# Patient Record
Sex: FEMALE | Race: WHITE | Employment: FULL TIME | URBAN - METROPOLITAN AREA
[De-identification: names, ages, dates, MRNs, and addresses within clinical notes are randomized per-mention and may not be internally consistent; named-entity substitution may affect disease eponyms.]

---

## 2016-04-18 LAB — MAMMOGRAPHY, EXTERNAL: NORMAL

## 2019-10-30 LAB — PAP SMEAR, EXTERNAL: NORMAL

## 2019-12-09 NOTE — PROGRESS NOTES
Name_______________________________________Printed:____________________  Date and time of surgery____12/16/19 @ 0800____________________Arrival Time:___0630 main hosp_____________   1. Do not eat or drink anything after 12 midnight (or____hours) prior to surgery. This includes no water, chewing gum or mints. Endoscopy patients follow your doctors bowel prep instructions,which may include taking part of prep after midnight If you have been instructed on ERAS or carb loading -please follow those instructions. 2. Take the following pills with a small sip of water on the morning of surgery_______none____________________________________________                  Do not take blood pressure medications ending in pril or sartan the navneet prior to surgery or the morning of surgery_   3. Aspirin, Ibuprofen, Advil, Naproxen, Vitamin E and other Anti-inflammatory products and supplements should be stopped for 5 -7days before surgery or as directed by your physician. 4. Check with your Doctor regarding stopping Plavix, Coumadin,Eliquis, Lovenox,Effient,Pradaxa,Xarelto, Fragmin or other blood thinners and follow their instructions. 5. Do not smoke, and do not drink any alcoholic beverages 24 hours prior to surgery. This includes NA Beer. Refrain from the usage of any recreational drugs. 6. You may brush your teeth and gargle the morning of surgery. DO NOT SWALLOW WATER   7. You MUST make arrangements for a responsible adult to stay on site while you are here and take you home after your surgery. You will not be allowed to leave alone or drive yourself home. It is strongly suggested someone stay with you the first 24 hrs. Your surgery will be cancelled if you do not have a ride home. 8. A parent/legal guardian must accompany a child scheduled for surgery and plan to stay at the hospital until the child is discharged. Please do not bring other children with you.    9. Please wear simple, loose fitting clothing to the \[facility]\"".  Antonio Dessert not bring valuables (money, credit cards, checkbooks, etc.) Do not wear any makeup (including no eye makeup) or nail polish on your fingers or toes. 10. DO NOT wear any jewelry or piercings on day of surgery. All body piercing jewelry must be removed. 11. If you have ___dentures, they will be removed before going to the OR; we will provide you a container. If you wear ___contact lenses or _x__glasses, they will be removed; please bring a case for them. 12. Please see your family doctor/pediatrician for a history & physical and/or concerning medications. Bring any test results/reports from your physician's office. PCP___wrenn_______________Phone___________H&P Appt. Date________             13 If you  have a Living Will and Durable Power of  for Healthcare, please bring in a copy. 15. Notify your Surgeon if you develop any illness between now and surgery  time, cough, cold, fever, sore throat, nausea, vomiting, etc.  Please notify your surgeon if you experience dizziness, shortness of breath or blurred vision between now & the time of your surgery             15. DO NOT shave your operative site 96 hours prior to surgery. For face & neck surgery, men may use an electric razor 48 hours prior to surgery. 16. Shower the night before or morning of surgery as directed. 17. To provide excellent care visitors will be limited to one in the room at any given time. 18.  Please bring picture ID and insurance card. 19.  Visit our web site for additional information:  [email or web address]              20.During flu season no children under the age of 15 are permitted in the hospital for the safety of all patients.                               21. If you take a long acting insulin in the evening only  take half of your usual  dose the night  before your procedure              22. If you use a c-pap please

## 2019-12-11 ENCOUNTER — HOSPITAL ENCOUNTER (OUTPATIENT)
Age: 54
Discharge: HOME | End: 2019-12-11
Payer: COMMERCIAL

## 2019-12-11 ENCOUNTER — HOSPITAL ENCOUNTER (OUTPATIENT)
Dept: GENERAL RADIOLOGY | Age: 54
Discharge: HOME | End: 2019-12-11
Payer: COMMERCIAL

## 2019-12-11 DIAGNOSIS — Z01.818 PREOP TESTING: ICD-10-CM

## 2019-12-11 LAB
A/G RATIO: 1.5 (ref 1.1–2.2)
ABO/RH: NORMAL
ALBUMIN SERPL-MCNC: 4.1 G/DL (ref 3.4–5)
ALP BLD-CCNC: 61 U/L (ref 40–129)
ALT SERPL-CCNC: 33 U/L (ref 10–40)
ANION GAP SERPL CALCULATED.3IONS-SCNC: 13 MMOL/L (ref 3–16)
ANTIBODY SCREEN: NORMAL
AST SERPL-CCNC: 18 U/L (ref 15–37)
BASOPHILS ABSOLUTE: 0 K/UL (ref 0–0.2)
BASOPHILS RELATIVE PERCENT: 0.7 %
BILIRUB SERPL-MCNC: 0.3 MG/DL (ref 0–1)
BILIRUBIN URINE: NEGATIVE
BLOOD, URINE: NEGATIVE
BUN BLDV-MCNC: 13 MG/DL (ref 7–20)
CALCIUM SERPL-MCNC: 9.4 MG/DL (ref 8.3–10.6)
CHLORIDE BLD-SCNC: 102 MMOL/L (ref 99–110)
CLARITY: CLEAR
CO2: 26 MMOL/L (ref 21–32)
COLOR: YELLOW
CREAT SERPL-MCNC: 0.7 MG/DL (ref 0.6–1.1)
EKG ATRIAL RATE: 71 BPM
EKG DIAGNOSIS: NORMAL
EKG P AXIS: 64 DEGREES
EKG P-R INTERVAL: 120 MS
EKG Q-T INTERVAL: 376 MS
EKG QRS DURATION: 76 MS
EKG QTC CALCULATION (BAZETT): 408 MS
EKG R AXIS: 52 DEGREES
EKG T AXIS: 42 DEGREES
EKG VENTRICULAR RATE: 71 BPM
EOSINOPHILS ABSOLUTE: 0.1 K/UL (ref 0–0.6)
EOSINOPHILS RELATIVE PERCENT: 1.7 %
GFR AFRICAN AMERICAN: >60
GFR NON-AFRICAN AMERICAN: >60
GLOBULIN: 2.8 G/DL
GLUCOSE BLD-MCNC: 82 MG/DL (ref 70–99)
GLUCOSE URINE: NEGATIVE MG/DL
HCT VFR BLD CALC: 42.1 % (ref 36–48)
HEMOGLOBIN: 14 G/DL (ref 12–16)
KETONES, URINE: NEGATIVE MG/DL
LEUKOCYTE ESTERASE, URINE: NEGATIVE
LYMPHOCYTES ABSOLUTE: 1.7 K/UL (ref 1–5.1)
LYMPHOCYTES RELATIVE PERCENT: 30.6 %
MCH RBC QN AUTO: 31.8 PG (ref 26–34)
MCHC RBC AUTO-ENTMCNC: 33.4 G/DL (ref 31–36)
MCV RBC AUTO: 95.2 FL (ref 80–100)
MICROSCOPIC EXAMINATION: NORMAL
MONOCYTES ABSOLUTE: 0.5 K/UL (ref 0–1.3)
MONOCYTES RELATIVE PERCENT: 9.2 %
NEUTROPHILS ABSOLUTE: 3.2 K/UL (ref 1.7–7.7)
NEUTROPHILS RELATIVE PERCENT: 57.8 %
NITRITE, URINE: NEGATIVE
PDW BLD-RTO: 13.2 % (ref 12.4–15.4)
PH UA: 7 (ref 5–8)
PLATELET # BLD: 258 K/UL (ref 135–450)
PMV BLD AUTO: 10.4 FL (ref 5–10.5)
POTASSIUM SERPL-SCNC: 4.3 MMOL/L (ref 3.5–5.1)
PROTEIN UA: NEGATIVE MG/DL
RBC # BLD: 4.42 M/UL (ref 4–5.2)
SODIUM BLD-SCNC: 141 MMOL/L (ref 136–145)
SPECIFIC GRAVITY UA: 1.01 (ref 1–1.03)
TOTAL PROTEIN: 6.9 G/DL (ref 6.4–8.2)
URINE TYPE: NORMAL
UROBILINOGEN, URINE: 0.2 E.U./DL
WBC # BLD: 5.5 K/UL (ref 4–11)

## 2019-12-11 PROCEDURE — 36415 COLL VENOUS BLD VENIPUNCTURE: CPT

## 2019-12-11 PROCEDURE — 93010 ELECTROCARDIOGRAM REPORT: CPT | Performed by: INTERNAL MEDICINE

## 2019-12-11 PROCEDURE — 86900 BLOOD TYPING SEROLOGIC ABO: CPT

## 2019-12-11 PROCEDURE — 85025 COMPLETE CBC W/AUTO DIFF WBC: CPT

## 2019-12-11 PROCEDURE — 80053 COMPREHEN METABOLIC PANEL: CPT

## 2019-12-11 PROCEDURE — 81003 URINALYSIS AUTO W/O SCOPE: CPT

## 2019-12-11 PROCEDURE — 93005 ELECTROCARDIOGRAM TRACING: CPT | Performed by: OBSTETRICS & GYNECOLOGY

## 2019-12-11 PROCEDURE — 86901 BLOOD TYPING SEROLOGIC RH(D): CPT

## 2019-12-11 PROCEDURE — 86850 RBC ANTIBODY SCREEN: CPT

## 2019-12-11 PROCEDURE — 87086 URINE CULTURE/COLONY COUNT: CPT

## 2019-12-11 PROCEDURE — 71046 X-RAY EXAM CHEST 2 VIEWS: CPT

## 2019-12-12 LAB — URINE CULTURE, ROUTINE: NORMAL

## 2019-12-16 ENCOUNTER — ANESTHESIA (OUTPATIENT)
Dept: OPERATING ROOM | Age: 54
DRG: 742 | End: 2019-12-16
Payer: COMMERCIAL

## 2019-12-16 ENCOUNTER — ANESTHESIA EVENT (OUTPATIENT)
Dept: OPERATING ROOM | Age: 54
DRG: 742 | End: 2019-12-16
Payer: COMMERCIAL

## 2019-12-16 ENCOUNTER — HOSPITAL ENCOUNTER (INPATIENT)
Age: 54
LOS: 3 days | Discharge: HOME | DRG: 742 | End: 2019-12-19
Attending: OBSTETRICS & GYNECOLOGY | Admitting: OBSTETRICS & GYNECOLOGY
Payer: COMMERCIAL

## 2019-12-16 VITALS
OXYGEN SATURATION: 100 % | TEMPERATURE: 97.5 F | RESPIRATION RATE: 52 BRPM | SYSTOLIC BLOOD PRESSURE: 157 MMHG | DIASTOLIC BLOOD PRESSURE: 81 MMHG

## 2019-12-16 PROBLEM — D27.0 MUCINOUS CYSTADENOMA OF OVARY, RIGHT: Status: ACTIVE | Noted: 2019-12-16

## 2019-12-16 LAB
ABO/RH: NORMAL
ANTIBODY SCREEN: NORMAL
APTT: 37.8 SEC (ref 24.2–36.2)
HCG(URINE) PREGNANCY TEST: NEGATIVE
INR BLD: 0.99 (ref 0.86–1.14)
PROTHROMBIN TIME: 11.5 SEC (ref 10–13.2)

## 2019-12-16 PROCEDURE — 88112 CYTOPATH CELL ENHANCE TECH: CPT

## 2019-12-16 PROCEDURE — 3600000004 HC SURGERY LEVEL 4 BASE: Performed by: OBSTETRICS & GYNECOLOGY

## 2019-12-16 PROCEDURE — 6360000002 HC RX W HCPCS: Performed by: OBSTETRICS & GYNECOLOGY

## 2019-12-16 PROCEDURE — 88305 TISSUE EXAM BY PATHOLOGIST: CPT

## 2019-12-16 PROCEDURE — 88332 PATH CONSLTJ SURG EA ADD BLK: CPT

## 2019-12-16 PROCEDURE — 7100000001 HC PACU RECOVERY - ADDTL 15 MIN: Performed by: OBSTETRICS & GYNECOLOGY

## 2019-12-16 PROCEDURE — 2500000003 HC RX 250 WO HCPCS: Performed by: FAMILY MEDICINE

## 2019-12-16 PROCEDURE — 6370000000 HC RX 637 (ALT 250 FOR IP): Performed by: OBSTETRICS & GYNECOLOGY

## 2019-12-16 PROCEDURE — 88331 PATH CONSLTJ SURG 1 BLK 1SPC: CPT

## 2019-12-16 PROCEDURE — 1200000000 HC SEMI PRIVATE

## 2019-12-16 PROCEDURE — 2580000003 HC RX 258

## 2019-12-16 PROCEDURE — 88307 TISSUE EXAM BY PATHOLOGIST: CPT

## 2019-12-16 PROCEDURE — 86901 BLOOD TYPING SEROLOGIC RH(D): CPT

## 2019-12-16 PROCEDURE — 2580000003 HC RX 258: Performed by: OBSTETRICS & GYNECOLOGY

## 2019-12-16 PROCEDURE — 36415 COLL VENOUS BLD VENIPUNCTURE: CPT

## 2019-12-16 PROCEDURE — 6360000002 HC RX W HCPCS: Performed by: FAMILY MEDICINE

## 2019-12-16 PROCEDURE — 2500000003 HC RX 250 WO HCPCS: Performed by: OBSTETRICS & GYNECOLOGY

## 2019-12-16 PROCEDURE — 84703 CHORIONIC GONADOTROPIN ASSAY: CPT

## 2019-12-16 PROCEDURE — 3700000000 HC ANESTHESIA ATTENDED CARE: Performed by: OBSTETRICS & GYNECOLOGY

## 2019-12-16 PROCEDURE — 88302 TISSUE EXAM BY PATHOLOGIST: CPT

## 2019-12-16 PROCEDURE — 86850 RBC ANTIBODY SCREEN: CPT

## 2019-12-16 PROCEDURE — 86900 BLOOD TYPING SEROLOGIC ABO: CPT

## 2019-12-16 PROCEDURE — 0UT90ZZ RESECTION OF UTERUS, OPEN APPROACH: ICD-10-PCS | Performed by: OBSTETRICS & GYNECOLOGY

## 2019-12-16 PROCEDURE — 3600000014 HC SURGERY LEVEL 4 ADDTL 15MIN: Performed by: OBSTETRICS & GYNECOLOGY

## 2019-12-16 PROCEDURE — 3E1M38X IRRIGATION OF PERITONEAL CAVITY USING IRRIGATING SUBSTANCE, PERCUTANEOUS APPROACH, DIAGNOSTIC: ICD-10-PCS | Performed by: OBSTETRICS & GYNECOLOGY

## 2019-12-16 PROCEDURE — 85730 THROMBOPLASTIN TIME PARTIAL: CPT

## 2019-12-16 PROCEDURE — 2709999900 HC NON-CHARGEABLE SUPPLY: Performed by: OBSTETRICS & GYNECOLOGY

## 2019-12-16 PROCEDURE — 85610 PROTHROMBIN TIME: CPT

## 2019-12-16 PROCEDURE — 7100000000 HC PACU RECOVERY - FIRST 15 MIN: Performed by: OBSTETRICS & GYNECOLOGY

## 2019-12-16 PROCEDURE — 94770 HC ETCO2 MONITOR DAILY: CPT

## 2019-12-16 PROCEDURE — 0UT10ZZ RESECTION OF LEFT OVARY, OPEN APPROACH: ICD-10-PCS | Performed by: OBSTETRICS & GYNECOLOGY

## 2019-12-16 PROCEDURE — 3700000001 HC ADD 15 MINUTES (ANESTHESIA): Performed by: OBSTETRICS & GYNECOLOGY

## 2019-12-16 PROCEDURE — 0DTJ0ZZ RESECTION OF APPENDIX, OPEN APPROACH: ICD-10-PCS | Performed by: OBSTETRICS & GYNECOLOGY

## 2019-12-16 PROCEDURE — 0UT70ZZ RESECTION OF BILATERAL FALLOPIAN TUBES, OPEN APPROACH: ICD-10-PCS | Performed by: OBSTETRICS & GYNECOLOGY

## 2019-12-16 RX ORDER — MAGNESIUM HYDROXIDE 1200 MG/15ML
LIQUID ORAL CONTINUOUS PRN
Status: COMPLETED | OUTPATIENT
Start: 2019-12-16 | End: 2019-12-16

## 2019-12-16 RX ORDER — PROPOFOL 10 MG/ML
INJECTION, EMULSION INTRAVENOUS PRN
Status: DISCONTINUED | OUTPATIENT
Start: 2019-12-16 | End: 2019-12-16 | Stop reason: SDUPTHER

## 2019-12-16 RX ORDER — SODIUM CHLORIDE, SODIUM LACTATE, POTASSIUM CHLORIDE, CALCIUM CHLORIDE 600; 310; 30; 20 MG/100ML; MG/100ML; MG/100ML; MG/100ML
INJECTION, SOLUTION INTRAVENOUS CONTINUOUS
Status: DISCONTINUED | OUTPATIENT
Start: 2019-12-16 | End: 2019-12-16

## 2019-12-16 RX ORDER — FENTANYL CITRATE 50 UG/ML
50 INJECTION, SOLUTION INTRAMUSCULAR; INTRAVENOUS EVERY 5 MIN PRN
Status: DISCONTINUED | OUTPATIENT
Start: 2019-12-16 | End: 2019-12-16 | Stop reason: HOSPADM

## 2019-12-16 RX ORDER — SODIUM CHLORIDE 9 MG/ML
INJECTION, SOLUTION INTRAVENOUS CONTINUOUS
Status: DISCONTINUED | OUTPATIENT
Start: 2019-12-16 | End: 2019-12-16

## 2019-12-16 RX ORDER — FENTANYL CITRATE 50 UG/ML
INJECTION, SOLUTION INTRAMUSCULAR; INTRAVENOUS PRN
Status: DISCONTINUED | OUTPATIENT
Start: 2019-12-16 | End: 2019-12-16 | Stop reason: SDUPTHER

## 2019-12-16 RX ORDER — LIDOCAINE HYDROCHLORIDE 10 MG/ML
0.5 INJECTION, SOLUTION EPIDURAL; INFILTRATION; INTRACAUDAL; PERINEURAL ONCE
Status: DISCONTINUED | OUTPATIENT
Start: 2019-12-16 | End: 2019-12-16 | Stop reason: HOSPADM

## 2019-12-16 RX ORDER — MAGNESIUM SULFATE HEPTAHYDRATE 500 MG/ML
INJECTION, SOLUTION INTRAMUSCULAR; INTRAVENOUS PRN
Status: DISCONTINUED | OUTPATIENT
Start: 2019-12-16 | End: 2019-12-16

## 2019-12-16 RX ORDER — SODIUM CHLORIDE 0.9 % (FLUSH) 0.9 %
10 SYRINGE (ML) INJECTION EVERY 12 HOURS SCHEDULED
Status: DISCONTINUED | OUTPATIENT
Start: 2019-12-16 | End: 2019-12-16 | Stop reason: HOSPADM

## 2019-12-16 RX ORDER — OXYCODONE HYDROCHLORIDE 5 MG/1
10 TABLET ORAL PRN
Status: DISCONTINUED | OUTPATIENT
Start: 2019-12-16 | End: 2019-12-16 | Stop reason: HOSPADM

## 2019-12-16 RX ORDER — DEXTROSE AND SODIUM CHLORIDE 5; .45 G/100ML; G/100ML
INJECTION, SOLUTION INTRAVENOUS CONTINUOUS
Status: DISCONTINUED | OUTPATIENT
Start: 2019-12-16 | End: 2019-12-17

## 2019-12-16 RX ORDER — HYDROMORPHONE HCL 110MG/55ML
0.5 PATIENT CONTROLLED ANALGESIA SYRINGE INTRAVENOUS EVERY 5 MIN PRN
Status: DISCONTINUED | OUTPATIENT
Start: 2019-12-16 | End: 2019-12-16 | Stop reason: HOSPADM

## 2019-12-16 RX ORDER — SODIUM CHLORIDE 0.9 % (FLUSH) 0.9 %
10 SYRINGE (ML) INJECTION EVERY 12 HOURS SCHEDULED
Status: DISCONTINUED | OUTPATIENT
Start: 2019-12-16 | End: 2019-12-19 | Stop reason: HOSPADM

## 2019-12-16 RX ORDER — MEPERIDINE HYDROCHLORIDE 25 MG/ML
12.5 INJECTION INTRAMUSCULAR; INTRAVENOUS; SUBCUTANEOUS EVERY 5 MIN PRN
Status: DISCONTINUED | OUTPATIENT
Start: 2019-12-16 | End: 2019-12-16 | Stop reason: HOSPADM

## 2019-12-16 RX ORDER — HYDROMORPHONE HCL 110MG/55ML
0.25 PATIENT CONTROLLED ANALGESIA SYRINGE INTRAVENOUS EVERY 5 MIN PRN
Status: DISCONTINUED | OUTPATIENT
Start: 2019-12-16 | End: 2019-12-16 | Stop reason: HOSPADM

## 2019-12-16 RX ORDER — VECURONIUM BROMIDE 1 MG/ML
INJECTION, POWDER, LYOPHILIZED, FOR SOLUTION INTRAVENOUS PRN
Status: DISCONTINUED | OUTPATIENT
Start: 2019-12-16 | End: 2019-12-16 | Stop reason: SDUPTHER

## 2019-12-16 RX ORDER — BUPIVACAINE HYDROCHLORIDE AND EPINEPHRINE 5; 5 MG/ML; UG/ML
INJECTION, SOLUTION PERINEURAL
Status: COMPLETED | OUTPATIENT
Start: 2019-12-16 | End: 2019-12-16

## 2019-12-16 RX ORDER — DEXAMETHASONE SODIUM PHOSPHATE 4 MG/ML
INJECTION, SOLUTION INTRA-ARTICULAR; INTRALESIONAL; INTRAMUSCULAR; INTRAVENOUS; SOFT TISSUE PRN
Status: DISCONTINUED | OUTPATIENT
Start: 2019-12-16 | End: 2019-12-16 | Stop reason: SDUPTHER

## 2019-12-16 RX ORDER — LIDOCAINE HYDROCHLORIDE 20 MG/ML
INJECTION, SOLUTION EPIDURAL; INFILTRATION; INTRACAUDAL; PERINEURAL PRN
Status: DISCONTINUED | OUTPATIENT
Start: 2019-12-16 | End: 2019-12-16 | Stop reason: SDUPTHER

## 2019-12-16 RX ORDER — MIDAZOLAM HYDROCHLORIDE 1 MG/ML
INJECTION INTRAMUSCULAR; INTRAVENOUS PRN
Status: DISCONTINUED | OUTPATIENT
Start: 2019-12-16 | End: 2019-12-16 | Stop reason: SDUPTHER

## 2019-12-16 RX ORDER — LABETALOL HYDROCHLORIDE 5 MG/ML
5 INJECTION, SOLUTION INTRAVENOUS EVERY 10 MIN PRN
Status: DISCONTINUED | OUTPATIENT
Start: 2019-12-16 | End: 2019-12-16 | Stop reason: HOSPADM

## 2019-12-16 RX ORDER — NALOXONE HYDROCHLORIDE 0.4 MG/ML
0.4 INJECTION, SOLUTION INTRAMUSCULAR; INTRAVENOUS; SUBCUTANEOUS PRN
Status: DISCONTINUED | OUTPATIENT
Start: 2019-12-16 | End: 2019-12-19 | Stop reason: HOSPADM

## 2019-12-16 RX ORDER — PROMETHAZINE HYDROCHLORIDE 25 MG/ML
6.25 INJECTION, SOLUTION INTRAMUSCULAR; INTRAVENOUS PRN
Status: DISCONTINUED | OUTPATIENT
Start: 2019-12-16 | End: 2019-12-16 | Stop reason: HOSPADM

## 2019-12-16 RX ORDER — ONDANSETRON 2 MG/ML
4 INJECTION INTRAMUSCULAR; INTRAVENOUS EVERY 8 HOURS PRN
Status: DISCONTINUED | OUTPATIENT
Start: 2019-12-16 | End: 2019-12-19 | Stop reason: HOSPADM

## 2019-12-16 RX ORDER — DEXTROSE AND SODIUM CHLORIDE 5; .45 G/100ML; G/100ML
INJECTION, SOLUTION INTRAVENOUS
Status: COMPLETED
Start: 2019-12-16 | End: 2019-12-16

## 2019-12-16 RX ORDER — ACETAMINOPHEN 500 MG
500 TABLET ORAL EVERY 6 HOURS
Status: DISCONTINUED | OUTPATIENT
Start: 2019-12-16 | End: 2019-12-19 | Stop reason: HOSPADM

## 2019-12-16 RX ORDER — SODIUM CHLORIDE 0.9 % (FLUSH) 0.9 %
10 SYRINGE (ML) INJECTION PRN
Status: DISCONTINUED | OUTPATIENT
Start: 2019-12-16 | End: 2019-12-16 | Stop reason: HOSPADM

## 2019-12-16 RX ORDER — MAGNESIUM HYDROXIDE 1200 MG/15ML
LIQUID ORAL
Status: COMPLETED | OUTPATIENT
Start: 2019-12-16 | End: 2019-12-16

## 2019-12-16 RX ORDER — ONDANSETRON 2 MG/ML
INJECTION INTRAMUSCULAR; INTRAVENOUS PRN
Status: DISCONTINUED | OUTPATIENT
Start: 2019-12-16 | End: 2019-12-16 | Stop reason: SDUPTHER

## 2019-12-16 RX ORDER — KETOROLAC TROMETHAMINE 30 MG/ML
INJECTION, SOLUTION INTRAMUSCULAR; INTRAVENOUS PRN
Status: DISCONTINUED | OUTPATIENT
Start: 2019-12-16 | End: 2019-12-16 | Stop reason: SDUPTHER

## 2019-12-16 RX ORDER — APREPITANT 40 MG/1
40 CAPSULE ORAL ONCE
Status: COMPLETED | OUTPATIENT
Start: 2019-12-16 | End: 2019-12-16

## 2019-12-16 RX ORDER — OXYCODONE HYDROCHLORIDE 5 MG/1
5 TABLET ORAL PRN
Status: DISCONTINUED | OUTPATIENT
Start: 2019-12-16 | End: 2019-12-16 | Stop reason: HOSPADM

## 2019-12-16 RX ORDER — DIPHENHYDRAMINE HYDROCHLORIDE 50 MG/ML
12.5 INJECTION INTRAMUSCULAR; INTRAVENOUS
Status: DISCONTINUED | OUTPATIENT
Start: 2019-12-16 | End: 2019-12-16 | Stop reason: HOSPADM

## 2019-12-16 RX ORDER — SODIUM CHLORIDE 0.9 % (FLUSH) 0.9 %
10 SYRINGE (ML) INJECTION PRN
Status: DISCONTINUED | OUTPATIENT
Start: 2019-12-16 | End: 2019-12-19 | Stop reason: HOSPADM

## 2019-12-16 RX ORDER — MAGNESIUM SULFATE HEPTAHYDRATE 500 MG/ML
INJECTION, SOLUTION INTRAMUSCULAR; INTRAVENOUS PRN
Status: DISCONTINUED | OUTPATIENT
Start: 2019-12-16 | End: 2019-12-16 | Stop reason: SDUPTHER

## 2019-12-16 RX ORDER — SUCCINYLCHOLINE/SOD CL,ISO/PF 200MG/10ML
SYRINGE (ML) INTRAVENOUS PRN
Status: DISCONTINUED | OUTPATIENT
Start: 2019-12-16 | End: 2019-12-16 | Stop reason: SDUPTHER

## 2019-12-16 RX ORDER — KETOROLAC TROMETHAMINE 15 MG/ML
15 INJECTION, SOLUTION INTRAMUSCULAR; INTRAVENOUS EVERY 6 HOURS
Status: COMPLETED | OUTPATIENT
Start: 2019-12-16 | End: 2019-12-18

## 2019-12-16 RX ORDER — CEFAZOLIN SODIUM 2 G/100ML
2 INJECTION, SOLUTION INTRAVENOUS
Status: COMPLETED | OUTPATIENT
Start: 2019-12-16 | End: 2019-12-16

## 2019-12-16 RX ADMIN — VECURONIUM BROMIDE 5 MG: 1 INJECTION, POWDER, LYOPHILIZED, FOR SOLUTION INTRAVENOUS at 08:30

## 2019-12-16 RX ADMIN — PROMETHAZINE HYDROCHLORIDE 6.25 MG: 25 INJECTION INTRAMUSCULAR; INTRAVENOUS at 10:34

## 2019-12-16 RX ADMIN — KETOROLAC TROMETHAMINE 30 MG: 30 INJECTION, SOLUTION INTRAMUSCULAR; INTRAVENOUS at 10:06

## 2019-12-16 RX ADMIN — FENTANYL CITRATE 50 MCG: 50 INJECTION, SOLUTION INTRAMUSCULAR; INTRAVENOUS at 08:20

## 2019-12-16 RX ADMIN — ACETAMINOPHEN 500 MG: 500 TABLET, FILM COATED ORAL at 13:18

## 2019-12-16 RX ADMIN — MAGNESIUM SULFATE HEPTAHYDRATE 1 G: 500 INJECTION, SOLUTION INTRAMUSCULAR; INTRAVENOUS at 08:29

## 2019-12-16 RX ADMIN — KETOROLAC TROMETHAMINE 15 MG: 15 INJECTION, SOLUTION INTRAMUSCULAR; INTRAVENOUS at 13:22

## 2019-12-16 RX ADMIN — LIDOCAINE HYDROCHLORIDE 100 MG: 20 INJECTION, SOLUTION EPIDURAL; INFILTRATION; INTRACAUDAL; PERINEURAL at 08:21

## 2019-12-16 RX ADMIN — DEXTROSE AND SODIUM CHLORIDE: 5; 450 INJECTION, SOLUTION INTRAVENOUS at 10:44

## 2019-12-16 RX ADMIN — PROPOFOL 140 MG: 10 INJECTION, EMULSION INTRAVENOUS at 08:22

## 2019-12-16 RX ADMIN — DEXAMETHASONE SODIUM PHOSPHATE 4 MG: 4 INJECTION, SOLUTION INTRAMUSCULAR; INTRAVENOUS at 08:29

## 2019-12-16 RX ADMIN — PHENYLEPHRINE HYDROCHLORIDE 50 MCG: 10 INJECTION INTRAVENOUS at 08:52

## 2019-12-16 RX ADMIN — ACETAMINOPHEN 500 MG: 500 TABLET, FILM COATED ORAL at 18:41

## 2019-12-16 RX ADMIN — HYDROMORPHONE HYDROCHLORIDE 0.5 MG: 2 INJECTION INTRAMUSCULAR; INTRAVENOUS; SUBCUTANEOUS at 10:35

## 2019-12-16 RX ADMIN — ONDANSETRON 4 MG: 2 INJECTION INTRAMUSCULAR; INTRAVENOUS at 08:29

## 2019-12-16 RX ADMIN — HYDROMORPHONE HYDROCHLORIDE 0.5 MG: 2 INJECTION INTRAMUSCULAR; INTRAVENOUS; SUBCUTANEOUS at 10:47

## 2019-12-16 RX ADMIN — KETOROLAC TROMETHAMINE 15 MG: 15 INJECTION, SOLUTION INTRAMUSCULAR; INTRAVENOUS at 18:44

## 2019-12-16 RX ADMIN — VECURONIUM BROMIDE 2 MG: 1 INJECTION, POWDER, LYOPHILIZED, FOR SOLUTION INTRAVENOUS at 09:06

## 2019-12-16 RX ADMIN — Medication 10 MG: at 11:02

## 2019-12-16 RX ADMIN — SUGAMMADEX 200 MG: 100 INJECTION, SOLUTION INTRAVENOUS at 09:52

## 2019-12-16 RX ADMIN — CEFAZOLIN SODIUM 2 G: 2 INJECTION, SOLUTION INTRAVENOUS at 08:07

## 2019-12-16 RX ADMIN — FENTANYL CITRATE 50 MCG: 50 INJECTION, SOLUTION INTRAMUSCULAR; INTRAVENOUS at 08:12

## 2019-12-16 RX ADMIN — MIDAZOLAM 2 MG: 1 INJECTION INTRAMUSCULAR; INTRAVENOUS at 08:16

## 2019-12-16 RX ADMIN — Medication 10 ML: at 21:24

## 2019-12-16 RX ADMIN — Medication 150 MG: at 08:22

## 2019-12-16 RX ADMIN — APREPITANT 40 MG: 40 CAPSULE ORAL at 08:07

## 2019-12-16 ASSESSMENT — PAIN SCALES - GENERAL
PAINLEVEL_OUTOF10: 3
PAINLEVEL_OUTOF10: 1
PAINLEVEL_OUTOF10: 0
PAINLEVEL_OUTOF10: 3
PAINLEVEL_OUTOF10: 4
PAINLEVEL_OUTOF10: 3
PAINLEVEL_OUTOF10: 7
PAINLEVEL_OUTOF10: 7

## 2019-12-16 ASSESSMENT — PULMONARY FUNCTION TESTS
PIF_VALUE: 13
PIF_VALUE: 14
PIF_VALUE: 14
PIF_VALUE: 13
PIF_VALUE: 14
PIF_VALUE: 14
PIF_VALUE: 33
PIF_VALUE: 14
PIF_VALUE: 19
PIF_VALUE: 14
PIF_VALUE: 19
PIF_VALUE: 24
PIF_VALUE: 14
PIF_VALUE: 14
PIF_VALUE: 13
PIF_VALUE: 14
PIF_VALUE: 14
PIF_VALUE: 15
PIF_VALUE: 0
PIF_VALUE: 14
PIF_VALUE: 19
PIF_VALUE: 13
PIF_VALUE: 13
PIF_VALUE: 0
PIF_VALUE: 13
PIF_VALUE: 14
PIF_VALUE: 14
PIF_VALUE: 19
PIF_VALUE: 14
PIF_VALUE: 14
PIF_VALUE: 19
PIF_VALUE: 14
PIF_VALUE: 19
PIF_VALUE: 0
PIF_VALUE: 13
PIF_VALUE: 1
PIF_VALUE: 13
PIF_VALUE: 14
PIF_VALUE: 19
PIF_VALUE: 13
PIF_VALUE: 14
PIF_VALUE: 13
PIF_VALUE: 14
PIF_VALUE: 1
PIF_VALUE: 14
PIF_VALUE: 14
PIF_VALUE: 13
PIF_VALUE: 14
PIF_VALUE: 14
PIF_VALUE: 19
PIF_VALUE: 27
PIF_VALUE: 13
PIF_VALUE: 13
PIF_VALUE: 14
PIF_VALUE: 13
PIF_VALUE: 14
PIF_VALUE: 19
PIF_VALUE: 4
PIF_VALUE: 14
PIF_VALUE: 19
PIF_VALUE: 0
PIF_VALUE: 14
PIF_VALUE: 14
PIF_VALUE: 19
PIF_VALUE: 0
PIF_VALUE: 14
PIF_VALUE: 14
PIF_VALUE: 13
PIF_VALUE: 19
PIF_VALUE: 12
PIF_VALUE: 13
PIF_VALUE: 13
PIF_VALUE: 14
PIF_VALUE: 14
PIF_VALUE: 19
PIF_VALUE: 14
PIF_VALUE: 13
PIF_VALUE: 21
PIF_VALUE: 0
PIF_VALUE: 0
PIF_VALUE: 13
PIF_VALUE: 13
PIF_VALUE: 14
PIF_VALUE: 19
PIF_VALUE: 7
PIF_VALUE: 14
PIF_VALUE: 15
PIF_VALUE: 14
PIF_VALUE: 14
PIF_VALUE: 15
PIF_VALUE: 0
PIF_VALUE: 14
PIF_VALUE: 13
PIF_VALUE: 14
PIF_VALUE: 4
PIF_VALUE: 14
PIF_VALUE: 14
PIF_VALUE: 13
PIF_VALUE: 1
PIF_VALUE: 14
PIF_VALUE: 14
PIF_VALUE: 0
PIF_VALUE: 13
PIF_VALUE: 19
PIF_VALUE: 19
PIF_VALUE: 13
PIF_VALUE: 0
PIF_VALUE: 14

## 2019-12-16 ASSESSMENT — PAIN DESCRIPTION - DESCRIPTORS: DESCRIPTORS: PRESSURE

## 2019-12-16 ASSESSMENT — PAIN DESCRIPTION - PAIN TYPE
TYPE: SURGICAL PAIN
TYPE: SURGICAL PAIN

## 2019-12-16 ASSESSMENT — PAIN - FUNCTIONAL ASSESSMENT: PAIN_FUNCTIONAL_ASSESSMENT: 0-10

## 2019-12-16 ASSESSMENT — PAIN DESCRIPTION - LOCATION: LOCATION: ABDOMEN;PELVIS

## 2019-12-16 NOTE — H&P
I have reviewed the history and physical and examined the patient and find no relevant changes. I have reviewed with the patient and/or family the risks, benefits, and alternatives to the procedure.     Chaitanya Valenzuela MD  12/16/2019

## 2019-12-16 NOTE — PROGRESS NOTES
Incentive Spirometry education and demonstration completed by Respiratory Therapy Yes         Response to education: Very Good     Teaching Time: 5 minutes    Minimum Predicted Vital Capacity - 639 mL. Patient's Actual Vital Capacity - 1200 mL. Turning over to Nursing for routine follow-up Yes.     Comments: continue spirometer q2h w/a    Electronically signed by Paramjit Galvan RCP on 12/16/2019 at 5:25 PM

## 2019-12-16 NOTE — BRIEF OP NOTE
Brief Postoperative Note  ______________________________________________________________    Patient: Oksana Bee  YOB: 1965  MRN: [id number]  Date of Procedure: 12/16/2019    Pre-Op Diagnosis: PELVIC MASS R19.00    Post-Op Diagnosis: Same       Procedure(s):  OPEN REMOVAL OF PELVIC MASS, HYSTERECTOMY, RIGHT SALPINGO- OOPHORECTOMY, LEFT SALPINGECTOMY, APPENDECTOMY    Anesthesia: General    Surgeon(s):  Javier Sena MD    Assistant:   RONALD Spencer    Estimated Blood Loss (mL): 297     Complications: None    Specimens:   ID Type Source Tests Collected by Time Destination   1 : 1) PELVIC WASHINGS FOR CYTOLOGY Body Fluid Fluid CYTOLOGY, NON-GYN Javier Sena MD 12/16/2019 6727    A : A) RIGHT FALLOPIAN TUBE AND OVARY FROZEN Tissue Tissue SURGICAL PATHOLOGY Javier Sena MD 12/16/2019 0854    B : B) UTERUS, CERVIX, LEFT FALLOPIAN TUBE Tissue Tissue SURGICAL PATHOLOGY Javier Sena MD 12/16/2019 7865    C : C) APPENDIX Tissue Tissue SURGICAL PATHOLOGY Javier Sena MD 12/16/2019 3843        Implants:  * No implants in log *      Drains:   Urethral Catheter Non-latex 16 fr (Active)       Findings: 24 cm right adnexal mass. Approximately 3 liters of fluid in mass. 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 Normal upper abdomen, left adnexa, uterus/cervix and appendix.      Javier Sena MD  Date: 12/16/2019  Time: 10:32 AM

## 2019-12-16 NOTE — PROGRESS NOTES
Patient resting in bed with eyes closed, tolerating water at this time. Offered more clear liquids but patient declines. Denies additional needs at this time. Bed alarm engaged, call light in reach. Will monitor.

## 2019-12-16 NOTE — PROGRESS NOTES
Incentive Spirometry education and demonstration completed by Respiratory Therapy yes     Response to education: Very Good     Teaching Time: 5 minutes    Minimum Predicted Vital Capacity - 639 mL. Patient's Actual Vital Capacity - 1200 mL. Turning over to Nursing for routine follow-up Yes.     Comments: continue spirometer q2h w/a    Electronically signed by Mario Vang RCP on 12/16/2019 at 5:31 PM

## 2019-12-17 LAB
ANION GAP SERPL CALCULATED.3IONS-SCNC: 9 MMOL/L (ref 3–16)
BASOPHILS ABSOLUTE: 0.1 K/UL (ref 0–0.2)
BASOPHILS RELATIVE PERCENT: 0.4 %
BUN BLDV-MCNC: 5 MG/DL (ref 7–20)
CALCIUM SERPL-MCNC: 8.2 MG/DL (ref 8.3–10.6)
CHLORIDE BLD-SCNC: 108 MMOL/L (ref 99–110)
CO2: 24 MMOL/L (ref 21–32)
CREAT SERPL-MCNC: 0.7 MG/DL (ref 0.6–1.1)
EOSINOPHILS ABSOLUTE: 0 K/UL (ref 0–0.6)
EOSINOPHILS RELATIVE PERCENT: 0 %
GFR AFRICAN AMERICAN: >60
GFR NON-AFRICAN AMERICAN: >60
GLUCOSE BLD-MCNC: 132 MG/DL (ref 70–99)
HCT VFR BLD CALC: 38.3 % (ref 36–48)
HEMOGLOBIN: 12.7 G/DL (ref 12–16)
LYMPHOCYTES ABSOLUTE: 1.1 K/UL (ref 1–5.1)
LYMPHOCYTES RELATIVE PERCENT: 8.9 %
MAGNESIUM: 2 MG/DL (ref 1.8–2.4)
MCH RBC QN AUTO: 31.7 PG (ref 26–34)
MCHC RBC AUTO-ENTMCNC: 33.3 G/DL (ref 31–36)
MCV RBC AUTO: 95.4 FL (ref 80–100)
MONOCYTES ABSOLUTE: 1 K/UL (ref 0–1.3)
MONOCYTES RELATIVE PERCENT: 8.3 %
NEUTROPHILS ABSOLUTE: 10.2 K/UL (ref 1.7–7.7)
NEUTROPHILS RELATIVE PERCENT: 82.4 %
PDW BLD-RTO: 13.3 % (ref 12.4–15.4)
PHOSPHORUS: 2.3 MG/DL (ref 2.5–4.9)
PLATELET # BLD: 218 K/UL (ref 135–450)
PMV BLD AUTO: 9.7 FL (ref 5–10.5)
POTASSIUM SERPL-SCNC: 4.1 MMOL/L (ref 3.5–5.1)
RBC # BLD: 4.01 M/UL (ref 4–5.2)
SODIUM BLD-SCNC: 141 MMOL/L (ref 136–145)
WBC # BLD: 12.4 K/UL (ref 4–11)

## 2019-12-17 PROCEDURE — 83735 ASSAY OF MAGNESIUM: CPT

## 2019-12-17 PROCEDURE — 36415 COLL VENOUS BLD VENIPUNCTURE: CPT

## 2019-12-17 PROCEDURE — 84100 ASSAY OF PHOSPHORUS: CPT

## 2019-12-17 PROCEDURE — 94770 HC ETCO2 MONITOR DAILY: CPT

## 2019-12-17 PROCEDURE — 1200000000 HC SEMI PRIVATE

## 2019-12-17 PROCEDURE — 6360000002 HC RX W HCPCS: Performed by: OBSTETRICS & GYNECOLOGY

## 2019-12-17 PROCEDURE — 80048 BASIC METABOLIC PNL TOTAL CA: CPT

## 2019-12-17 PROCEDURE — 85025 COMPLETE CBC W/AUTO DIFF WBC: CPT

## 2019-12-17 PROCEDURE — 6370000000 HC RX 637 (ALT 250 FOR IP): Performed by: OBSTETRICS & GYNECOLOGY

## 2019-12-17 PROCEDURE — 2580000003 HC RX 258: Performed by: OBSTETRICS & GYNECOLOGY

## 2019-12-17 PROCEDURE — 94761 N-INVAS EAR/PLS OXIMETRY MLT: CPT

## 2019-12-17 RX ORDER — BACITRACIN, NEOMYCIN, POLYMYXIN B 400; 3.5; 5 [USP'U]/G; MG/G; [USP'U]/G
OINTMENT TOPICAL 2 TIMES DAILY
Status: DISCONTINUED | OUTPATIENT
Start: 2019-12-17 | End: 2019-12-19 | Stop reason: HOSPADM

## 2019-12-17 RX ORDER — CALCIUM CARBONATE 200(500)MG
1000 TABLET,CHEWABLE ORAL 3 TIMES DAILY PRN
Status: DISCONTINUED | OUTPATIENT
Start: 2019-12-17 | End: 2019-12-19 | Stop reason: HOSPADM

## 2019-12-17 RX ORDER — DOCUSATE SODIUM 100 MG/1
100 CAPSULE, LIQUID FILLED ORAL 2 TIMES DAILY PRN
Status: DISCONTINUED | OUTPATIENT
Start: 2019-12-17 | End: 2019-12-18

## 2019-12-17 RX ORDER — HYDROMORPHONE HYDROCHLORIDE 1 MG/ML
0.5 INJECTION, SOLUTION INTRAMUSCULAR; INTRAVENOUS; SUBCUTANEOUS EVERY 4 HOURS PRN
Status: DISCONTINUED | OUTPATIENT
Start: 2019-12-17 | End: 2019-12-19 | Stop reason: HOSPADM

## 2019-12-17 RX ORDER — OXYCODONE HYDROCHLORIDE 5 MG/1
10 TABLET ORAL EVERY 4 HOURS PRN
Status: DISCONTINUED | OUTPATIENT
Start: 2019-12-17 | End: 2019-12-19 | Stop reason: HOSPADM

## 2019-12-17 RX ORDER — OXYCODONE HYDROCHLORIDE 5 MG/1
5 TABLET ORAL EVERY 4 HOURS PRN
Status: DISCONTINUED | OUTPATIENT
Start: 2019-12-17 | End: 2019-12-19 | Stop reason: HOSPADM

## 2019-12-17 RX ADMIN — Medication 10 ML: at 22:10

## 2019-12-17 RX ADMIN — KETOROLAC TROMETHAMINE 15 MG: 15 INJECTION, SOLUTION INTRAMUSCULAR; INTRAVENOUS at 06:45

## 2019-12-17 RX ADMIN — ENOXAPARIN SODIUM 40 MG: 40 INJECTION SUBCUTANEOUS at 10:17

## 2019-12-17 RX ADMIN — ACETAMINOPHEN 500 MG: 500 TABLET, FILM COATED ORAL at 00:46

## 2019-12-17 RX ADMIN — OXYCODONE HYDROCHLORIDE 5 MG: 5 TABLET ORAL at 17:53

## 2019-12-17 RX ADMIN — BACITRACIN ZINC NEOMYCIN SULFATE POLYMYXIN B SULFATE: 400; 3.5; 5 OINTMENT TOPICAL at 11:07

## 2019-12-17 RX ADMIN — KETOROLAC TROMETHAMINE 15 MG: 15 INJECTION, SOLUTION INTRAMUSCULAR; INTRAVENOUS at 12:10

## 2019-12-17 RX ADMIN — ACETAMINOPHEN 500 MG: 500 TABLET, FILM COATED ORAL at 06:45

## 2019-12-17 RX ADMIN — ACETAMINOPHEN 500 MG: 500 TABLET, FILM COATED ORAL at 17:53

## 2019-12-17 RX ADMIN — DEXTROSE AND SODIUM CHLORIDE: 5; 450 INJECTION, SOLUTION INTRAVENOUS at 02:49

## 2019-12-17 RX ADMIN — BACITRACIN ZINC NEOMYCIN SULFATE POLYMYXIN B SULFATE: 400; 3.5; 5 OINTMENT TOPICAL at 21:00

## 2019-12-17 RX ADMIN — ACETAMINOPHEN 500 MG: 500 TABLET, FILM COATED ORAL at 12:10

## 2019-12-17 RX ADMIN — KETOROLAC TROMETHAMINE 15 MG: 15 INJECTION, SOLUTION INTRAMUSCULAR; INTRAVENOUS at 17:54

## 2019-12-17 RX ADMIN — KETOROLAC TROMETHAMINE 15 MG: 15 INJECTION, SOLUTION INTRAMUSCULAR; INTRAVENOUS at 00:46

## 2019-12-17 RX ADMIN — ANTACID TABLETS 1000 MG: 500 TABLET, CHEWABLE ORAL at 11:06

## 2019-12-17 RX ADMIN — OXYCODONE HYDROCHLORIDE 5 MG: 5 TABLET ORAL at 22:30

## 2019-12-17 ASSESSMENT — PAIN DESCRIPTION - LOCATION: LOCATION: ABDOMEN

## 2019-12-17 ASSESSMENT — PAIN SCALES - GENERAL
PAINLEVEL_OUTOF10: 5
PAINLEVEL_OUTOF10: 3
PAINLEVEL_OUTOF10: 2
PAINLEVEL_OUTOF10: 5
PAINLEVEL_OUTOF10: 2
PAINLEVEL_OUTOF10: 4
PAINLEVEL_OUTOF10: 4

## 2019-12-17 ASSESSMENT — PAIN DESCRIPTION - PAIN TYPE: TYPE: SURGICAL PAIN

## 2019-12-17 NOTE — PROGRESS NOTES
Shift assessment complete. VSS, BP slightly low, pt up drinking fluids and eating. Pt has pain 5/10, PCA pump used. Pt c/o hearburn and irritation where gaitan securement sticker taken off, orders for tums and neosporin placed. Family at bedside, POC reviewed. Pt walked in weaver, but does not want to sit in chair, stating \"it's uncomfortable\". Pt willing to walk after lunch again. Pt has urinated since removal of gaitan. No further needs. Will monitor.

## 2019-12-17 NOTE — PROGRESS NOTES
VSS, assessment completed, and meds given. Pt appears calm with no signs of distress. Breathing is regular and unlabored. Pt using incentive spirometer as directed. Pt refusing advanced diet foods at this time but did take juice. Call light is within reach and bed alarm is engaged. No further needs at this time.

## 2019-12-18 PROCEDURE — 2580000003 HC RX 258: Performed by: OBSTETRICS & GYNECOLOGY

## 2019-12-18 PROCEDURE — 1200000000 HC SEMI PRIVATE

## 2019-12-18 PROCEDURE — 94760 N-INVAS EAR/PLS OXIMETRY 1: CPT

## 2019-12-18 PROCEDURE — 6360000002 HC RX W HCPCS: Performed by: OBSTETRICS & GYNECOLOGY

## 2019-12-18 PROCEDURE — 6370000000 HC RX 637 (ALT 250 FOR IP): Performed by: OBSTETRICS & GYNECOLOGY

## 2019-12-18 RX ORDER — SENNA PLUS 8.6 MG/1
2 TABLET ORAL NIGHTLY
Status: DISCONTINUED | OUTPATIENT
Start: 2019-12-18 | End: 2019-12-19 | Stop reason: HOSPADM

## 2019-12-18 RX ORDER — POLYETHYLENE GLYCOL 3350 17 G/17G
17 POWDER, FOR SOLUTION ORAL DAILY
Status: DISCONTINUED | OUTPATIENT
Start: 2019-12-18 | End: 2019-12-19 | Stop reason: HOSPADM

## 2019-12-18 RX ORDER — KETOROLAC TROMETHAMINE 15 MG/ML
15 INJECTION, SOLUTION INTRAMUSCULAR; INTRAVENOUS EVERY 6 HOURS
Status: DISCONTINUED | OUTPATIENT
Start: 2019-12-18 | End: 2019-12-19 | Stop reason: HOSPADM

## 2019-12-18 RX ADMIN — ACETAMINOPHEN 500 MG: 500 TABLET, FILM COATED ORAL at 19:25

## 2019-12-18 RX ADMIN — POLYETHYLENE GLYCOL 3350 17 G: 17 POWDER, FOR SOLUTION ORAL at 11:08

## 2019-12-18 RX ADMIN — Medication 10 ML: at 16:58

## 2019-12-18 RX ADMIN — HYDROMORPHONE HYDROCHLORIDE 0.5 MG: 1 INJECTION, SOLUTION INTRAMUSCULAR; INTRAVENOUS; SUBCUTANEOUS at 00:55

## 2019-12-18 RX ADMIN — KETOROLAC TROMETHAMINE 15 MG: 15 INJECTION, SOLUTION INTRAMUSCULAR; INTRAVENOUS at 00:56

## 2019-12-18 RX ADMIN — ENOXAPARIN SODIUM 40 MG: 40 INJECTION SUBCUTANEOUS at 10:22

## 2019-12-18 RX ADMIN — ACETAMINOPHEN 500 MG: 500 TABLET, FILM COATED ORAL at 00:56

## 2019-12-18 RX ADMIN — SENNOSIDES 17.2 MG: 8.6 TABLET, FILM COATED ORAL at 21:39

## 2019-12-18 RX ADMIN — ACETAMINOPHEN 500 MG: 500 TABLET, FILM COATED ORAL at 13:13

## 2019-12-18 RX ADMIN — KETOROLAC TROMETHAMINE 15 MG: 15 INJECTION, SOLUTION INTRAMUSCULAR; INTRAVENOUS at 16:58

## 2019-12-18 RX ADMIN — KETOROLAC TROMETHAMINE 15 MG: 15 INJECTION, SOLUTION INTRAMUSCULAR; INTRAVENOUS at 10:22

## 2019-12-18 RX ADMIN — OXYCODONE HYDROCHLORIDE 5 MG: 5 TABLET ORAL at 17:00

## 2019-12-18 RX ADMIN — Medication 10 ML: at 10:23

## 2019-12-18 RX ADMIN — KETOROLAC TROMETHAMINE 15 MG: 15 INJECTION, SOLUTION INTRAMUSCULAR; INTRAVENOUS at 21:39

## 2019-12-18 RX ADMIN — Medication 10 ML: at 21:40

## 2019-12-18 RX ADMIN — ACETAMINOPHEN 500 MG: 500 TABLET, FILM COATED ORAL at 06:53

## 2019-12-18 RX ADMIN — KETOROLAC TROMETHAMINE 15 MG: 15 INJECTION, SOLUTION INTRAMUSCULAR; INTRAVENOUS at 06:53

## 2019-12-18 ASSESSMENT — PAIN SCALES - GENERAL
PAINLEVEL_OUTOF10: 2
PAINLEVEL_OUTOF10: 3
PAINLEVEL_OUTOF10: 2
PAINLEVEL_OUTOF10: 5
PAINLEVEL_OUTOF10: 1
PAINLEVEL_OUTOF10: 8

## 2019-12-18 ASSESSMENT — PAIN DESCRIPTION - LOCATION: LOCATION: ABDOMEN

## 2019-12-18 ASSESSMENT — PAIN DESCRIPTION - PAIN TYPE: TYPE: SURGICAL PAIN

## 2019-12-18 NOTE — PROGRESS NOTES
Shift assessment complete. VSS. Pt states pain is 2/10, scheduled Toradol given. Pt to begin bowel regimen with Miralax and Senekot tonight. Pt up to bathroom, ambulated in halls and linens changed. Dressing changed on IV d/t drainage. No further needs at this time. Will monitor.

## 2019-12-19 VITALS
TEMPERATURE: 98.2 F | HEIGHT: 68 IN | WEIGHT: 170 LBS | DIASTOLIC BLOOD PRESSURE: 65 MMHG | RESPIRATION RATE: 16 BRPM | SYSTOLIC BLOOD PRESSURE: 110 MMHG | HEART RATE: 74 BPM | OXYGEN SATURATION: 96 % | BODY MASS INDEX: 25.76 KG/M2

## 2019-12-19 PROBLEM — D68.52 PROTHROMBIN GENE MUTATION (HCC): Status: ACTIVE | Noted: 2019-12-19

## 2019-12-19 LAB
ANION GAP SERPL CALCULATED.3IONS-SCNC: 8 MMOL/L (ref 3–16)
BASOPHILS ABSOLUTE: 0 K/UL (ref 0–0.2)
BASOPHILS RELATIVE PERCENT: 0.4 %
BUN BLDV-MCNC: 8 MG/DL (ref 7–20)
CALCIUM SERPL-MCNC: 9.3 MG/DL (ref 8.3–10.6)
CHLORIDE BLD-SCNC: 103 MMOL/L (ref 99–110)
CO2: 31 MMOL/L (ref 21–32)
CREAT SERPL-MCNC: 0.7 MG/DL (ref 0.6–1.1)
EOSINOPHILS ABSOLUTE: 0.1 K/UL (ref 0–0.6)
EOSINOPHILS RELATIVE PERCENT: 1.8 %
GFR AFRICAN AMERICAN: >60
GFR NON-AFRICAN AMERICAN: >60
GLUCOSE BLD-MCNC: 93 MG/DL (ref 70–99)
HCT VFR BLD CALC: 39.4 % (ref 36–48)
HEMOGLOBIN: 12.9 G/DL (ref 12–16)
LYMPHOCYTES ABSOLUTE: 1.5 K/UL (ref 1–5.1)
LYMPHOCYTES RELATIVE PERCENT: 24 %
MCH RBC QN AUTO: 31.7 PG (ref 26–34)
MCHC RBC AUTO-ENTMCNC: 32.8 G/DL (ref 31–36)
MCV RBC AUTO: 96.7 FL (ref 80–100)
MONOCYTES ABSOLUTE: 0.5 K/UL (ref 0–1.3)
MONOCYTES RELATIVE PERCENT: 8.8 %
NEUTROPHILS ABSOLUTE: 4 K/UL (ref 1.7–7.7)
NEUTROPHILS RELATIVE PERCENT: 65 %
PDW BLD-RTO: 13.4 % (ref 12.4–15.4)
PLATELET # BLD: 215 K/UL (ref 135–450)
PMV BLD AUTO: 10.2 FL (ref 5–10.5)
POTASSIUM REFLEX MAGNESIUM: 4.9 MMOL/L (ref 3.5–5.1)
RBC # BLD: 4.08 M/UL (ref 4–5.2)
SODIUM BLD-SCNC: 142 MMOL/L (ref 136–145)
WBC # BLD: 6.1 K/UL (ref 4–11)

## 2019-12-19 PROCEDURE — 2580000003 HC RX 258: Performed by: OBSTETRICS & GYNECOLOGY

## 2019-12-19 PROCEDURE — 36415 COLL VENOUS BLD VENIPUNCTURE: CPT

## 2019-12-19 PROCEDURE — 6370000000 HC RX 637 (ALT 250 FOR IP): Performed by: OBSTETRICS & GYNECOLOGY

## 2019-12-19 PROCEDURE — 94760 N-INVAS EAR/PLS OXIMETRY 1: CPT

## 2019-12-19 PROCEDURE — 80048 BASIC METABOLIC PNL TOTAL CA: CPT

## 2019-12-19 PROCEDURE — 85025 COMPLETE CBC W/AUTO DIFF WBC: CPT

## 2019-12-19 PROCEDURE — 6360000002 HC RX W HCPCS: Performed by: OBSTETRICS & GYNECOLOGY

## 2019-12-19 RX ORDER — IBUPROFEN 600 MG/1
600 TABLET ORAL EVERY 6 HOURS
Qty: 30 TABLET | Refills: 0 | Status: SHIPPED | OUTPATIENT
Start: 2019-12-19 | End: 2020-12-07 | Stop reason: ALTCHOICE

## 2019-12-19 RX ORDER — OXYCODONE HYDROCHLORIDE 5 MG/1
5 TABLET ORAL EVERY 4 HOURS PRN
Qty: 12 TABLET | Refills: 0
Start: 2019-12-19 | End: 2019-12-22

## 2019-12-19 RX ORDER — SENNA PLUS 8.6 MG/1
2 TABLET ORAL NIGHTLY PRN
Qty: 10 TABLET | Refills: 0 | Status: SHIPPED | OUTPATIENT
Start: 2019-12-19 | End: 2020-12-07 | Stop reason: ALTCHOICE

## 2019-12-19 RX ORDER — WARFARIN SODIUM 5 MG/1
5 TABLET ORAL DAILY
Qty: 30 TABLET | Refills: 0 | Status: SHIPPED | OUTPATIENT
Start: 2019-12-24 | End: 2020-12-07 | Stop reason: ALTCHOICE

## 2019-12-19 RX ORDER — ACETAMINOPHEN 500 MG
500 TABLET ORAL EVERY 6 HOURS PRN
Qty: 30 TABLET | Refills: 0
Start: 2019-12-19 | End: 2020-12-07

## 2019-12-19 RX ADMIN — KETOROLAC TROMETHAMINE 15 MG: 15 INJECTION, SOLUTION INTRAMUSCULAR; INTRAVENOUS at 09:34

## 2019-12-19 RX ADMIN — KETOROLAC TROMETHAMINE 15 MG: 15 INJECTION, SOLUTION INTRAMUSCULAR; INTRAVENOUS at 15:20

## 2019-12-19 RX ADMIN — Medication 10 ML: at 09:34

## 2019-12-19 RX ADMIN — Medication 10 ML: at 15:20

## 2019-12-19 RX ADMIN — KETOROLAC TROMETHAMINE 15 MG: 15 INJECTION, SOLUTION INTRAMUSCULAR; INTRAVENOUS at 05:06

## 2019-12-19 RX ADMIN — POLYETHYLENE GLYCOL 3350 17 G: 17 POWDER, FOR SOLUTION ORAL at 09:36

## 2019-12-19 RX ADMIN — ENOXAPARIN SODIUM 40 MG: 40 INJECTION SUBCUTANEOUS at 09:31

## 2019-12-19 RX ADMIN — ACETAMINOPHEN 500 MG: 500 TABLET, FILM COATED ORAL at 08:02

## 2019-12-19 RX ADMIN — ACETAMINOPHEN 500 MG: 500 TABLET, FILM COATED ORAL at 12:17

## 2019-12-19 RX ADMIN — ACETAMINOPHEN 500 MG: 500 TABLET, FILM COATED ORAL at 00:30

## 2019-12-19 RX ADMIN — OXYCODONE HYDROCHLORIDE 5 MG: 5 TABLET ORAL at 00:30

## 2019-12-19 ASSESSMENT — PAIN DESCRIPTION - DESCRIPTORS
DESCRIPTORS: PRESSURE
DESCRIPTORS: PRESSURE

## 2019-12-19 ASSESSMENT — PAIN SCALES - GENERAL
PAINLEVEL_OUTOF10: 3
PAINLEVEL_OUTOF10: 1
PAINLEVEL_OUTOF10: 6
PAINLEVEL_OUTOF10: 2
PAINLEVEL_OUTOF10: 1
PAINLEVEL_OUTOF10: 1
PAINLEVEL_OUTOF10: 3

## 2019-12-19 ASSESSMENT — PAIN DESCRIPTION - PAIN TYPE
TYPE: SURGICAL PAIN

## 2019-12-19 ASSESSMENT — PAIN DESCRIPTION - LOCATION
LOCATION: ABDOMEN

## 2019-12-19 ASSESSMENT — PAIN DESCRIPTION - FREQUENCY
FREQUENCY: CONTINUOUS
FREQUENCY: CONTINUOUS

## 2019-12-19 NOTE — PROGRESS NOTES
CLINICAL PHARMACY NOTE: MEDS TO [address] Select Patient?: No  Total # of Prescriptions Filled: 4   The following medications were delivered to the patient:  · Ibuprofen 600mg  · Oxycodone 5mg  · Senna 8.6mg  · Enoxaparin 40mg/0.4ml  Total # of Interventions Completed: 0  Time Spent (min): 30    Additional Documentation:  Delivered to patient Nurse [place] RN  Warfarin stored on profile  Blanchie Balloon CPhT

## 2019-12-19 NOTE — PROGRESS NOTES
Bedside rounding completed with Klely Roman RN. Pt denies needs at this time. White board updated. Call light in hand and pt verbalizes correct use. All needs within reach. Bed alarm set. Will continue to monitor.  Electronically signed by Henrry Mckeon RN on 12/19/2019 at 8:09 AM

## 2020-01-03 NOTE — OP NOTE
induced without difficulty and preoperative antibiotics were infused. The patient was prepped and draped in the usual sterile fashion and a Barrera catheter was placed. A vertical midline skin incision was made 2 cm above the umbilicus down to the pubic symphysis and carried down to underlying layer of fascia with the Bovie. The fascia was excised and the peritoneal cavity was entered. No ascitic fluid was appreciated. Cytology was collected. A Book-Omid retractor was placed. We encountered a  right 24 cm ovarian mass. An win wound retractor was placed. Using a suture technique and multiple suctions, I was able to drain the mass to enable delivery through the abdominal wall. The right pelvic sidewall peritoneum was elevated and entered using the Bovie. The ureter was identified. The infundibulopelvic ligament was skeletonized and clamped with the LigaSure device and fulgurated. We then fulgurated the uteroovarian pedicle and sent the mass to pathology. 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 The left ovary is normal in appearance. The mesosalpinx was fulgurated and the left tube was cut and removed from its ovarian attachment. Zonia clamps were used to grasp the bilateral uterine cornua and elevate the uterus. The round ligaments were fulgurated bilaterally and the bladder flap was created using the Bovie. The uterine arteries were clamped with curved Zeppelin, cut, and suture ligated with 0 Vicryl suture. We then clamped the remaining cardinal and broad ligament with straight Dipti clamps, cutting the tissue and suture ligating with 0 Vicryl suture. We then continued until we reached   the cervicovaginal angle which was grasped with Zeppelin clamps. The tissue was   cut. The uterus and cervix were sent to pathology for permanent section and the vagina was closed with 0 Vicryl suture in a running fashion. Laparotomy sponges were removed.  Identified the appendix and using the LigaSure

## 2020-12-07 ENCOUNTER — OFFICE VISIT (OUTPATIENT)
Dept: ENDOCRINOLOGY | Age: 55
End: 2020-12-07
Payer: COMMERCIAL

## 2020-12-07 VITALS
OXYGEN SATURATION: 98 % | HEIGHT: 68 IN | DIASTOLIC BLOOD PRESSURE: 81 MMHG | WEIGHT: 185.6 LBS | HEART RATE: 78 BPM | BODY MASS INDEX: 28.13 KG/M2 | RESPIRATION RATE: 18 BRPM | SYSTOLIC BLOOD PRESSURE: 125 MMHG

## 2020-12-07 PROCEDURE — 99204 OFFICE O/P NEW MOD 45 MIN: CPT | Performed by: INTERNAL MEDICINE

## 2020-12-07 NOTE — PROGRESS NOTES
Subjective:      62 y/o who is here for thyroid evaluation. Referred by [facility]. She has seen endocrinologist in the past.    Dx few years ago    Labs:    10/19 TSH 1.2      3/16 USG  Right Lobe:  7.3 x 1.7 x 1.8 cm. There is a hypoechoic solid nodule at the inferior pole measuring 2.3 x 2.2 x 1.3 cm. This has increased vascularity. Inferior to this there is a 1.7 x 1.3 x 0.9 cm nodule. There is internal vascularity. Anteriorly there is a 1 x 0.5 cm nodule. Left Lobe:    5.2 x 1.9 x 1.1 cm. There is no dominant nodule. Subcentimeter nodule measures up to 5 mm. 1.  Solid hypoechoic nodules on the left measuring up to 2.3 cm. There is internal vascularity. 2.  Significantly enlarged gland    Thyroid scan: The thyroid scan demonstrates the thyroid to be diffusely enlarged with nonuniform tracer distribution. There is an area of relatively decreased uptake inferiorly on the right corresponding with the clinically palpable nodule. There are also small areas of relatively increased uptake in the right upper and both mid lobes. I could feel no associated nodularity however. IMPRESSION:   The patient has a nontoxic multinodular goiter with both functioning and nonfunctioning nodules with the largest (hypofunctioning) located inferiorly on the right.     Reports FNA on the right by Dr. 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  It was in 2012  Reports buffalo hump and supraclavicular pad since then    Reports h/o PCOS in the past    H/o ovarian cyst removal    Reports Neuropathy on right    SH: PHD in bio medical sciences      Past Medical History:   Diagnosis Date    Hx of blood clots     PE x 2, last one jan 2018    Prothrombin gene mutation [facility])      Past Surgical History:   Procedure Laterality Date    ANKLE SURGERY      HYSTERECTOMY, TOTAL ABDOMINAL N/A 12/16/2019    OPEN REMOVAL OF PELVIC MASS, HYSTERECTOMY, RIGHT SALPINGO- OOPHORECTOMY, LEFT SALPINGECTOMY, APPENDECTOMY performed by Sin Griffiths MD at [facility]      TONSILLECTOMY       Current Outpatient Medications   Medication Sig Dispense Refill    enoxaparin (LOVENOX) 80 MG/0.8ML injection Inject 0.8 mLs into the skin 2 times daily 10 Syringe 0    warfarin (COUMADIN) 5 MG tablet Take 1 tablet by mouth daily 30 tablet 0    ibuprofen (ADVIL;MOTRIN) 600 MG tablet Take 1 tablet by mouth every 6 hours 30 tablet 0    senna (SENOKOT) 8.6 MG tablet Take 2 tablets by mouth nightly as needed for Constipation 10 tablet 0    acetaminophen (TYLENOL) 500 MG tablet Take 1 tablet by mouth every 6 hours as needed for Pain 30 tablet 0     No current facility-administered medications for this visit. Review of Systems  Please see scanned     Objective: There were no vitals taken for this visit. Wt Readings from Last 3 Encounters:   12/16/19 170 lb (77.1 kg)     Vitals:    12/07/20 1208   BP: 125/81   Pulse: 78   Resp: 18   SpO2: 98%     Wt 185    Constitutional: Well-developed, appears stated age, cooperative, in no acute distress  H/E/N/M/T:atraumatic, normocephalic, external ears, nose, lips normal without lesions  No facial puffiness, no hoarseness    Eyes: Lids, lashes, conjunctivae and sclerae normal, No proptosis, no lid lag, no stare  Neck: supple, symmetrical, trachea midline   Thyroid: gland size normal, non-tender on palpation  Respiratory: clear to auscultation bilaterally and breathing is unlabored  Cardiovascular: regular rate and rhythm, S1, S2, regular rate and rhythm, no murmur, rub or gallop. Skin: No obvious rashes or lesions present.   Skin and hair texture normal  Psychiatric: Judgement and Insight:  judgement and insight appear normal  Neuro: Normal without focal findings, speech is normal normal, speech is spontaneous    Lab Review  No results found for: TSH  No results found for: FREET4      Assessment: 1. Thyroid Nodules:  She has multiple thyroid nodules. Right inferior 2.3 cm nodule. Inferior to it 1.7 cm nodule. Anterior 1cm nodule . Left sub centimer nodules. Although report does mention Left 2.3cm ? She report FNA of right dominant 2.3cm nodule- benign. Needs repeat USG . It was cold nodule per report. TFT normal. Scan had showed area of low activity in right lower part and small area of hyper in right upper and both mid lobes. 2. Weight gain : Check TFT and 24 hour urine cortisol    3. H/o PE    4. H/o Cyst     5. Fatigue: Order TFT, B12, Vitamin D and ferritin level. She has been on B12, D and iron in the past, off of it since 7/20. Will check dosages. Order levels    Plan:     1. TFT  2. Thyroid USG. Repeat FNA based on size  3. Check B12 and Vitamin D

## 2021-08-09 ENCOUNTER — HOSPITAL ENCOUNTER (OUTPATIENT)
Dept: ULTRASOUND IMAGING | Age: 56
Discharge: HOME | End: 2021-08-09
Payer: COMMERCIAL

## 2021-08-09 DIAGNOSIS — E04.2 MULTIPLE THYROID NODULES: ICD-10-CM

## 2021-08-09 PROCEDURE — 76536 US EXAM OF HEAD AND NECK: CPT

## 2021-08-09 NOTE — PROGRESS NOTES
Dominant echogenic nodule is located in the midpole of the right lobe measuring 3.2 x 3.2 x 1.6 cm with smooth margins and mildly hypoechoic rim or halo. By clinical history this has been previously biopsied     A second dominant echogenic nodule is located at the right inferior pole is a 2.5 x 2.2 x 1.5 cm., Also with smooth margins and thin hypoechoic rim. No definite internal calcifications.     An adjacent 9 mm isoechoic nodule is also located at the inferior pole medially     Within the left thyroid lobe, 2 small hypoechoic nodules measure 7 x 6 x 5 mm and 4 x 3 x 3 mm, respectively.  Both are smoothly marginated    Schedule FNA of right inferior 2.5cm nodule  She has a h/o 3.2cm nodule FNA

## 2022-08-09 ENCOUNTER — HOSPITAL ENCOUNTER (OUTPATIENT)
Dept: GENERAL RADIOLOGY | Age: 57
Discharge: HOME | End: 2022-08-09
Payer: COMMERCIAL

## 2022-08-09 ENCOUNTER — OFFICE VISIT (OUTPATIENT)
Dept: PRIMARY CARE CLINIC | Age: 57
End: 2022-08-09
Payer: COMMERCIAL

## 2022-08-09 VITALS
HEART RATE: 82 BPM | SYSTOLIC BLOOD PRESSURE: 126 MMHG | WEIGHT: 176 LBS | DIASTOLIC BLOOD PRESSURE: 80 MMHG | TEMPERATURE: 97.3 F | OXYGEN SATURATION: 96 % | RESPIRATION RATE: 16 BRPM | HEIGHT: 68 IN | BODY MASS INDEX: 26.67 KG/M2

## 2022-08-09 DIAGNOSIS — R06.02 SHORTNESS OF BREATH: ICD-10-CM

## 2022-08-09 DIAGNOSIS — U09.9 POST-COVID SYNDROME: Primary | ICD-10-CM

## 2022-08-09 DIAGNOSIS — Z12.11 COLON CANCER SCREENING: ICD-10-CM

## 2022-08-09 DIAGNOSIS — M54.50 RIGHT LOW BACK PAIN, UNSPECIFIED CHRONICITY, UNSPECIFIED WHETHER SCIATICA PRESENT: ICD-10-CM

## 2022-08-09 DIAGNOSIS — M25.511 RIGHT SHOULDER PAIN, UNSPECIFIED CHRONICITY: ICD-10-CM

## 2022-08-09 DIAGNOSIS — R10.31 RIGHT LOWER QUADRANT ABDOMINAL PAIN: ICD-10-CM

## 2022-08-09 DIAGNOSIS — R05.3 PERSISTENT COUGH: ICD-10-CM

## 2022-08-09 DIAGNOSIS — L71.9 ROSACEA: ICD-10-CM

## 2022-08-09 DIAGNOSIS — H91.90 HEARING LOSS, UNSPECIFIED HEARING LOSS TYPE, UNSPECIFIED LATERALITY: ICD-10-CM

## 2022-08-09 DIAGNOSIS — M79.644 PAIN OF RIGHT THUMB: ICD-10-CM

## 2022-08-09 PROCEDURE — 99204 OFFICE O/P NEW MOD 45 MIN: CPT | Performed by: INTERNAL MEDICINE

## 2022-08-09 PROCEDURE — 71046 X-RAY EXAM CHEST 2 VIEWS: CPT

## 2022-08-09 RX ORDER — ASPIRIN 81 MG/1
243 TABLET ORAL DAILY
COMMUNITY

## 2022-08-09 RX ORDER — METRONIDAZOLE 7.5 MG/G
GEL TOPICAL
Qty: 45 G | Refills: 0 | Status: SHIPPED | OUTPATIENT
Start: 2022-08-09

## 2022-08-09 RX ORDER — ALBUTEROL SULFATE 90 UG/1
AEROSOL, METERED RESPIRATORY (INHALATION)
COMMUNITY
Start: 2022-07-19

## 2022-08-09 RX ORDER — MELOXICAM 7.5 MG/1
7.5 TABLET ORAL DAILY
Qty: 30 TABLET | Refills: 0 | Status: SHIPPED | OUTPATIENT
Start: 2022-08-09

## 2022-08-09 RX ORDER — BENZONATATE 100 MG/1
100 CAPSULE ORAL 3 TIMES DAILY PRN
Qty: 30 CAPSULE | Refills: 0 | Status: SHIPPED | OUTPATIENT
Start: 2022-08-09 | End: 2022-08-16

## 2022-08-09 SDOH — ECONOMIC STABILITY: FOOD INSECURITY: WITHIN THE PAST 12 MONTHS, YOU WORRIED THAT YOUR FOOD WOULD RUN OUT BEFORE YOU GOT MONEY TO BUY MORE.: NEVER TRUE

## 2022-08-09 SDOH — ECONOMIC STABILITY: FOOD INSECURITY: WITHIN THE PAST 12 MONTHS, THE FOOD YOU BOUGHT JUST DIDN'T LAST AND YOU DIDN'T HAVE MONEY TO GET MORE.: NEVER TRUE

## 2022-08-09 ASSESSMENT — SOCIAL DETERMINANTS OF HEALTH (SDOH): HOW HARD IS IT FOR YOU TO PAY FOR THE VERY BASICS LIKE FOOD, HOUSING, MEDICAL CARE, AND HEATING?: NOT HARD AT ALL

## 2022-08-09 ASSESSMENT — ENCOUNTER SYMPTOMS
SINUS PRESSURE: 0
BACK PAIN: 1
VOMITING: 0
CONSTIPATION: 0
DIARRHEA: 0
WHEEZING: 0
CHEST TIGHTNESS: 0
SORE THROAT: 0
RHINORRHEA: 0
NAUSEA: 0
BLOOD IN STOOL: 0

## 2022-08-09 ASSESSMENT — PATIENT HEALTH QUESTIONNAIRE - PHQ9
SUM OF ALL RESPONSES TO PHQ QUESTIONS 1-9: 0
1. LITTLE INTEREST OR PLEASURE IN DOING THINGS: 0
9. THOUGHTS THAT YOU WOULD BE BETTER OFF DEAD, OR OF HURTING YOURSELF: 0
8. MOVING OR SPEAKING SO SLOWLY THAT OTHER PEOPLE COULD HAVE NOTICED. OR THE OPPOSITE, BEING SO FIGETY OR RESTLESS THAT YOU HAVE BEEN MOVING AROUND A LOT MORE THAN USUAL: 0
10. IF YOU CHECKED OFF ANY PROBLEMS, HOW DIFFICULT HAVE THESE PROBLEMS MADE IT FOR YOU TO DO YOUR WORK, TAKE CARE OF THINGS AT HOME, OR GET ALONG WITH OTHER PEOPLE: 0
2. FEELING DOWN, DEPRESSED OR HOPELESS: 0
SUM OF ALL RESPONSES TO PHQ9 QUESTIONS 1 & 2: 0
5. POOR APPETITE OR OVEREATING: 0
3. TROUBLE FALLING OR STAYING ASLEEP: 0
7. TROUBLE CONCENTRATING ON THINGS, SUCH AS READING THE NEWSPAPER OR WATCHING TELEVISION: 0
4. FEELING TIRED OR HAVING LITTLE ENERGY: 0
SUM OF ALL RESPONSES TO PHQ QUESTIONS 1-9: 0
6. FEELING BAD ABOUT YOURSELF - OR THAT YOU ARE A FAILURE OR HAVE LET YOURSELF OR YOUR FAMILY DOWN: 0
SUM OF ALL RESPONSES TO PHQ QUESTIONS 1-9: 0
SUM OF ALL RESPONSES TO PHQ QUESTIONS 1-9: 0

## 2022-08-09 NOTE — PROGRESS NOTES
Yadi Arcadio   YOB: 1965    Date of Visit:  8/9/2022    Chief Complaint   Patient presents with    Establish Care     Patient did not have PCP. Saw specialists which are in careverywhere    Post-COVID Symptoms    Back Pain     Fell down steps    Hearing Problem     Felt like fluid in middle ear. Shoulder Pain    Joint Pain     Right thumb       HPI  Patient presents to establish care. Patient states she had Covid last week of May. Patient took a home Covid test. Patient states she had GI symptoms, body aches, mild URI symptoms. Patient states she didn't see anyone for Covid. Patient went back to work in June. Patient states she started getting dizzy spells 3 weeks after she tested positive. Patient states it was like vertigo and she had stopped up ears that lasted for 3 weeks. Patient states she took Flonase nasal spray and it helped the feeling of pressure in her head. Patient states she had fatigue the whole time and still does. Patient states she felt like her scalp was bruised 5-6 weeks later and she started losing a ton of hair. Patient states the hair loss has slowed down. Patient states 3 weeks ago she had shortness of breath and intense pain left lower lobe like someone was driving a nail in her left lower rib. Patient states the pain would stop her in her tracks. Patient states mild exertion is causing her shortness of breath. Patient states she went to [facility] on 7/19/22. Patient states Keflex and Albuterol inhaler were prescribed which helped but she still has the symptoms but not nearly as bad as a few weeks ago. Patient states she has an occasional dry cough specifically in the morning. Patient has tried Mucinex which helps some and a marlon pot. Patient has brain fog. Patient took Tumeric, elderberry, bovine colostrum, and mighty lungs supplement for respiratory support. Patient takes a cough drop once in awhile.     Patient states on 5/1/22 she fell down the steps of her sister's deck and landed on outer left thigh and slid. Patient states she had a cabbage size bruise on side of thigh that resolved and skin is  but not nearly as bad as it was. Patient states she still has a lump. Patient states she has right low back and right shoulder pain since injury. Low back pain is always dull achy and occasionally sharp. Patient states pain is worse with change in positions getting up and sitting down. Patient states she has to be a little careful when she first stands up. Right shoulder is dull achy and getting better. Patient states she doesn't have the power with lifting as she should. Patient states her range of motion is somewhat limited with pulling forward with arm outstretched. Patient hasn't taken anything for pain. Patient states she is active. Patient states she had grabbed the railing when she was going down the steps and was trying to stop her fall. Patient states she has had right thumb pain since. Patient has pain base of thumb. Patient states she has a rectocele. Patient states she has diet dependent constipation which is a source of frustration and she can manage it if she is cognizant of her diet. Patient states she has a deep pain right lower quadrant since her gyn surgery right lower quadrant pain is sharp with palpation and dull achy at other times. Patient states pain is intense and she wakes up with it every morning. Patient states her ovary, uterus, appendix, and fallopian tube removed 12/2019. Patient states abdominal pain has been more noticeable since back pain from her injury. Colonoscopy 2012. Patient has a prothrombin mutation. Patient would like hearing tested. 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 Patient wants a gel for rosacea. Review of Systems   Constitutional:  Positive for fatigue.  Negative for activity change, appetite change, chills and fever. HENT:  Positive for hearing loss. Negative for congestion, postnasal drip, rhinorrhea, sinus pressure, sore throat and trouble swallowing. Eyes:  Negative for visual disturbance. Respiratory:  Positive for cough and shortness of breath. Negative for chest tightness and wheezing. Cardiovascular:  Negative for chest pain, palpitations and leg swelling. Gastrointestinal:  Positive for abdominal pain. Negative for abdominal distention, blood in stool, constipation, diarrhea, nausea and vomiting. Genitourinary:  Negative for dysuria, flank pain, frequency and hematuria. Musculoskeletal:  Positive for arthralgias and back pain. Negative for gait problem, joint swelling and myalgias. Skin:  Positive for color change (rosacea). Negative for rash. Neurological:  Negative for dizziness, tremors, syncope, weakness, light-headedness, numbness and headaches. Psychiatric/Behavioral:  Negative for dysphoric mood and sleep disturbance. The patient is not nervous/anxious. Past Medical History:   Diagnosis Date    Hx of blood clots     PE x 2, last one jan 2018    Prothrombin gene mutation ([facility] [address]        Past Surgical History:   Procedure Laterality Date    ANKLE SURGERY      APPENDECTOMY      HYSTERECTOMY, TOTAL ABDOMINAL (CERVIX REMOVED) N/A 12/16/2019    OPEN REMOVAL OF PELVIC MASS, HYSTERECTOMY, RIGHT SALPINGO- OOPHORECTOMY, LEFT SALPINGECTOMY, APPENDECTOMY performed by Kat Richard MD at [place] [address]         Outpatient Medications Marked as Taking for the 8/9/22 encounter (Office Visit) with Lalo Lao MD   Medication Sig Dispense Refill    albuterol sulfate HFA (PROVENTIL;VENTOLIN;PROAIR) 108 (90 Base) MCG/ACT inhaler       aspirin 81 MG EC tablet Take 243 mg by mouth in the morning. Takes 3 tablets daily.            Allergies   Allergen Reactions    Latex      gloves    Zithromax [Azithromycin] Other (See Comments)     Ayala-jie syndrome       Social History     Tobacco Use    Smoking status: Never    Smokeless tobacco: Never   Substance Use Topics    Alcohol use: Yes     Comment: occassional       Family History   Problem Relation Age of Onset    High Blood Pressure Mother     Cancer Father     High Blood Pressure Sister     Diabetes Brother     Cancer Brother        Immunization History   Administered Date(s) Administered    COVID-19, PFIZER PURPLE top, DILUTE for use, (age 15 y+), 30mcg/0.3mL 03/26/2021, 04/19/2021    Tdap (Boostrix, Adacel) 01/30/2013, 05/12/2014       Vitals:    08/09/22 1018   BP: 126/80   Pulse: 82   Resp: 16   Temp: 97.3 °F (36.3 °C)   SpO2: 96%   Weight: 176 lb (79.8 kg)   Height: 5' 8\" (1.727 m)     Body mass index is 26.76 kg/m². Physical Exam  Nursing note reviewed. Constitutional:       General: She is not in acute distress. Appearance: Normal appearance. She is well-developed. Eyes:      General: Lids are normal.      Extraocular Movements: Extraocular movements intact. Conjunctiva/sclera: Conjunctivae normal.      Pupils: Pupils are equal, round, and reactive to light. Neck:      Thyroid: No thyromegaly. Vascular: No carotid bruit. Cardiovascular:      Rate and Rhythm: Normal rate and regular rhythm. Heart sounds: Normal heart sounds, S1 normal and S2 normal. No murmur heard. No friction rub. No gallop. Pulmonary:      Effort: Pulmonary effort is normal. No respiratory distress. Breath sounds: Normal breath sounds. No wheezing, rhonchi or rales. Abdominal:      General: Bowel sounds are normal. There is no distension. Palpations: Abdomen is soft. Tenderness: There is abdominal tenderness in the right lower quadrant. Musculoskeletal:      Right shoulder: No tenderness. Right hand: Tenderness (right thumb) present. No swelling. Cervical back: Neck supple.       Lumbar back: Tenderness (right low back) present. No spasms. Normal range of motion. Right lower leg: No edema. Left lower leg: No edema. Lymphadenopathy:      Head:      Right side of head: No submandibular adenopathy. Left side of head: No submandibular adenopathy. Skin:     Findings: Erythema (rosacea) present. Neurological:      Mental Status: She is alert and oriented to person, place, and time. Gait: Gait normal.      Deep Tendon Reflexes: Reflexes are normal and symmetric. Psychiatric:         Mood and Affect: Mood normal.             No results found for this visit on 08/09/22. Assessment/Plan     1. Post-COVID syndrome  -History of COVID in May 2022  -Lingering symptoms  - XR CHEST (2 VW); Future  - benzonatate (TESSALON PERLES) 100 MG capsule; Take 1 capsule by mouth 3 times daily as needed for Cough  Dispense: 30 capsule; Refill: 0    2. Shortness of breath  - XR CHEST (2 VW); Future  -Continue albuterol inhaler 2 puffs every 6 hours as needed    3. Persistent cough  - XR CHEST (2 VW); Future  - benzonatate (TESSALON PERLES) 100 MG capsule; Take 1 capsule by mouth 3 times daily as needed for Cough  Dispense: 30 capsule; Refill: 0    4. Right low back pain, unspecified chronicity, unspecified whether sciatica present  -Start meloxicam (MOBIC) 7.5 MG tablet; Take 1 tablet by mouth in the morning. Dispense: 30 tablet; Refill: 0  -Low back exercises  -Referral to [facility] MD DAGOBERTO, Orthopedic Surgery (Primary Care Sports Medicine), [facility] [place]    5. Right lower quadrant abdominal pain  - CT ABDOMEN PELVIS W IV CONTRAST Additional Contrast? Oral; Future  -Referral to Adam Huang MD, Gastroenterology, [facility]  - CBC with Auto Differential; Future  - Comprehensive Metabolic Panel; Future    6. Right shoulder pain, unspecified chronicity  -Start meloxicam (MOBIC) 7.5 MG tablet; Take 1 tablet by mouth in the morning. Dispense: 30 tablet;  Refill: 0  -Referral to [facility] - Kristyn Hallman MD, Orthopedic Surgery ([address]), [facility]    7. Pain of right thumb  -Start meloxicam (MOBIC) 7.5 MG tablet; Take 1 tablet by mouth in the morning. Dispense: 30 tablet; Refill: 0  -Referral to Ike Cox MD, Orthopedic Surgery (Primary Care Sports Medicine), [facility]    8. Hearing loss, unspecified hearing loss type, unspecified laterality  -[place], Audiology, [facility]    9. Rosacea  - metroNIDAZOLE (METROGEL) 0.75 % gel; Apply topically 2 times daily. Dispense: 45 g; Refill: 0    10. Colon cancer screening  -Referral to Gracie Dow MD, Gastroenterology, [facility] for colonoscopy      Discussed medications with patient, who voiced understanding of their use and indications. All questions answered.     Return in about 3 weeks (around 8/30/2022) for annual physical exam.

## 2022-08-11 ENCOUNTER — TELEPHONE (OUTPATIENT)
Dept: PRIMARY CARE CLINIC | Age: 57
End: 2022-08-11

## 2022-08-11 NOTE — TELEPHONE ENCOUNTER
Patient returned call regarding xray results. I read the physician notes, patient would like someone to call her back about the scoliosis.

## 2022-08-19 ASSESSMENT — ENCOUNTER SYMPTOMS
COUGH: 1
TROUBLE SWALLOWING: 0
ABDOMINAL DISTENTION: 0
SHORTNESS OF BREATH: 1
ABDOMINAL PAIN: 1
COLOR CHANGE: 1

## 2022-12-08 ENCOUNTER — OFFICE VISIT (OUTPATIENT)
Dept: ORTHOPEDIC SURGERY | Age: 57
End: 2022-12-08

## 2022-12-08 VITALS — WEIGHT: 176 LBS | BODY MASS INDEX: 26.67 KG/M2 | HEIGHT: 68 IN

## 2022-12-08 DIAGNOSIS — M72.2 PLANTAR FASCIITIS, BILATERAL: Primary | ICD-10-CM

## 2022-12-08 NOTE — PROGRESS NOTES
[facility]  Outpatient Progress Note  Kaylee Larry MD    Patient Name: Carolina Duarte MRN: [id number]   Age: 61 y.o. YOB: 1965   Sex: female      [address] Complaint   Patient presents with    New Patient     Bilateral feet pain         HISTORY OF PRESENT ILLNESS   Carolina Duarte is a 61 y.o. female planes of bilateral foot pain right greater than left. She has been experiencing pain in the plantar surface of her feet and the plantar surface of the heel extending into the plantar medial arch. She has symptoms typical of Planter fasciitis with start up pain. She has tried shoewear modification which has helped some. She has had no previous evaluation or treatment for these complaints. Pain Assessment  Location of Pain: Foot  Location Modifiers: Left, Right  Severity of Pain: 3  Quality of Pain: Aching, Throbbing  Duration of Pain: Persistent  Frequency of Pain: Constant  Aggravating Factors: Walking, Standing, Stairs  Limiting Behavior: Some  Relieving Factors: Rest  Result of Injury: No  Work-Related Injury: No  Are there other pain locations you wish to document?: No      PAST MEDICAL HISTORY      Past Medical History:   Diagnosis Date    Hx of blood clots     PE x 2, last one jan 2018    Prothrombin gene mutation ([facility])        PAST SURGICAL HISTORY     Past Surgical History:   Procedure Laterality Date    ANKLE SURGERY      APPENDECTOMY      HYSTERECTOMY, TOTAL ABDOMINAL (CERVIX REMOVED) N/A 12/16/2019    OPEN REMOVAL OF PELVIC MASS, HYSTERECTOMY, RIGHT SALPINGO- OOPHORECTOMY, LEFT SALPINGECTOMY, APPENDECTOMY performed by Fatuma Lees MD at [address]     Current Outpatient Medications   Medication Sig Dispense Refill    albuterol sulfate HFA (PROVENTIL;VENTOLIN;PROAIR) 108 (90 Base) MCG/ACT inhaler       aspirin 81 MG EC tablet Take 243 mg by mouth in the morning.  Takes 3 tablets daily. meloxicam (MOBIC) 7.5 MG tablet Take 1 tablet by mouth in the morning. 30 tablet 0    metroNIDAZOLE (METROGEL) 0.75 % gel Apply topically 2 times daily. 45 g 0     No current facility-administered medications for this visit.        ALLERGIES     Allergies   Allergen Reactions    Latex      gloves    Zithromax [Azithromycin] Other (See Comments)     Ayala-jie syndrome       FAMILY HISTORY     Family History   Problem Relation Age of Onset    High Blood Pressure Mother     Cancer Father     High Blood Pressure Sister     Diabetes Brother     Cancer Brother        SOCIAL HISTORY     Social History     Socioeconomic History    Marital status: Single     Spouse name: None    Number of children: None    Years of education: None    Highest education level: None   Tobacco Use    Smoking status: Never    Smokeless tobacco: Never   Vaping Use    Vaping Use: Never used   Substance and Sexual Activity    Alcohol use: Yes     Comment: occassional    Drug use: Never    Sexual activity: Not Currently     Social Determinants of Health     Financial Resource Strain: Low Risk     Difficulty of Paying Living Expenses: Not hard at all   Food Insecurity: No Food Insecurity    Worried About Running Out of Food in the Last Year: Never true    Ran Out of Food in the Last Year: Never true       REVIEW OF SYSTEMS   General: no fever, chills, night sweats, anorexia, malaise, fatigue, or weight change  Hematologic:  no unexplained bleeding or bruising  HEENT:   no nasal congestion, rhinorrhea, sore throat, or facial pain  Respiratory:  no cough, dyspnea, or chest pain  Cardiovascular:  no angina, WHITAKER, PND, orthopnea, dependent edema, or palpitations  Gastrointestinal:  no nausea, vomiting, diarrhea, constipation, or abdominal pain  Genitourinary:  no urinary urgency, frequency, dysuria, or hematuria  Musculoskeletal: see HPI  Endocrine:  no heat or cold intolerance and no polyphagia, polydipsia, or polyuria  Skin:  no skin eruptions or changing lesions  Neurologic:  no focal weakness, numbness/tingling, tremor, or severe headache. See HPI. See HPI for pertinent positives. PHYSICAL EXAM   Vital Signs:   Vitals:    12/08/22 1457   Weight: 176 lb (79.8 kg)   Height: 5' 8\" (1.727 m)       General appearance: healthy, alert, no distress  Skin: Skin color, texture, turgor normal. No rashes or lesions  HEENT: atraumatic, normocephalic. PERRL  Respiratory: Unlabored breathing  Lymphatic: No adenopathy   Neuro: Alert and oriented, normal distal sensation, normal bilateral DTRs  Vascular: Normal distal capillary and distal pulses  Muskuloskeletal Exam:   Bilateral feet without swelling or edema. No erythema or warmth. No ecchymosis. Normal weightbearing alignment of the ankle hindfoot midfoot and forefoot. No lesser toe deformities. No skin lesions or callosities. Moderate bilateral gastrocnemius equinus is noted. Tenderness noted at the plantar fascial origin and along the medial band of the plantar fascia bilaterally. There is a small plantar fibroma distally in the medial band of the fascia of the left foot. She is neurovascularly intact    RADIOLOGY   X-rays obtained and reviewed in office:  Views bilateral feet and ankle standing 3 views    Impression: No acute bony abnormality    IMPRESSION     1. Plantar fasciitis, bilateral         PLAN   I had a lengthy discussion with patient today regarding diagnosis and treatment options and recommendations. We had a lengthy discussion today regarding diagnosis Pap though physiology and treatment recommendations for plantar fasciitis. I have recommended shoewear modification. I have prescribed a longitudinal arch support. I have recommended a Planter fasciitis night splint. I have recommended ice and anti-inflammatory medication for symptom control.   Most importantly I stressed the need to begin a regular Achilles and plantar fascial stretching program.  She was instructed on home exercise program today. I explained the long frustrating nature of Planter fasciitis and of explained this will be a process which will likely take 3 to 6 months. FOLLOWUP     Return if symptoms worsen or fail to improve. Orders Placed This Encounter   Procedures    XR FOOT RIGHT (MIN 3 VIEWS)     Standing Status:   Future     Number of Occurrences:   1     Standing Expiration Date:   12/8/2023     Order Specific Question:   Reason for exam:     Answer:   pain    XR ANKLE RIGHT (MIN 3 VIEWS)     Standing Status:   Future     Number of Occurrences:   1     Standing Expiration Date:   12/8/2023    External Referral For Orthotics     Referral Priority:   Routine     Referral Type:   Eval and Treat     Referral Reason:   Specialty Services Required     Requested Specialty:   Orthotics Supplier     Number of Visits Requested:   1      No orders of the defined types were placed in this encounter.       Patient was instructed on appropriate use of braces, participation in home exercise programs, healthy lifestyle choices and weight loss as appropriate     Lyman Severs, MD

## 2022-12-08 NOTE — PATIENT INSTRUCTIONS
ACHILLES TENDON STRETCHING PROGRAM       Hello and welcome to the most important stretch that you can do for your lower legs and feet. The achilles tendon is 9 times stronger than any other lower leg tendon. When this tendon gets tight, it causes a cascade of gait changes that can cause injury to other tendons and joints in the foot. Our stretching program is aimed at treating the primary problem, achilles tendon tightness. In turn, the problems that you are experiencing, which are being caused or exacerbated by the tightness, will improve and resolve in time. Stretching your achilles tendon is the best maintenance you can do for your foot. Achilles tendon tightness develops gradually in all of us as time goes by. Some people have a genetic propensity to get tighter more quickly and at a younger age. Others may perform activities or sports that accelerate the tightness. The exact cause is not critical.  What is critical is diagnosing the tightness and treating it. Untreated achilles tendon tightness can lead to a host of problems including:  plantar fasciitis, achilles tendinitis, midfoot arthritis, metatarsalgia, and hammertoes to name just a few. The stretching program outlined below  has been validated hundreds and hundreds of times over the last 17 years of our practice. Try to follow the stretching protocol as closely as you can. It is designed to gradually improve your flexibility safely and comfortably. Some patients may require as little as 3 weeks to see improvement, while others may need upwards of 5-6 months to break through a long standing Achilles contracture. This stretching should be done 3 times a day and divided roughly throughout the day. You should begin the stretch at 15 seconds three times daily and gradually increase your stretching as explained later in the handout. Within 2 months you will be stretching 9 minutes a day.       The stretch can be performed on the edge of a stool or steps, or by using a tilt board or similar device. It is OK for your toes to angle upward; you should not be gripping the step with your toes. If you are in the correct position you should feel a pulling or tightness in your upper calf muscle, just below the knee. Be sure to time your stretching:  time never moves more slowly than when stretching your calves! The majority of patients will experience new pain somewhere between 2-6 weeks after beginning the stretching program.  This is definitely expected and is usually a minor increase in pain and should not be excruciating. If it becomes severe, please contact the office. If you continue the stretching program and work through this pain, it typically begins to resolve within a few weeks. As you progress with the stretching program, your response will have an up-and-down course (you will have some good days and bad days)--this is expected and normal.  While not all patients symptoms are relieved completely, usually a satisfactory result is obtained within three to six months. You have to BE PATIENT. You will not likely see improvement in the first few weeks and may not see any at all until the 3rd month. Eventually your pain will ease. Remember,  this is a maintenance stretch, not a vaccination:  you will have to keep stretching for the rest of your life. Our Stretching Protocol  How long and how often should I stretch my calves? Find your starting point based on your age, fitness level and health status. (See scale below.) Use the numbers (1-4) on the device as points of reference, with 1 being the least intense, and when the device is turned around, 4 providing the most intense stretch. There is not a set position to start stretching, as all people are at different fitness and flexibility levels. Once you have found a position that provides a good stretch, you can work from there.  The optimal stretching time is ultimately 3 minutes 3 times per day, EVERY DAY. Do the stretching in one session or cluster. Think of it as doing three sets, and each time you are on the One Stretch being the repetitions in that particular set. After each set (15 sec., 3 min. , etc.) is completed, take a brief rest (brush your teeth, please), and then on to the next set. Complete all three sets and you are finished for the day. There is no benefit to spreading these exercises throughout the day, even though this would seem logical. However this method makes it easy to forget and quit. A light to moderate stretch that you feel in your calf is as good as a hard intense stretch. Be Patient! You can decide for yourself the length and number of sets you want to do, but this is our recommended stretching protocol. Suggested Starting Points and Weekly Progression  Most people start right out at 3 minutes 3 time per day. However, we recommend considering a more conservative, safer start using shorter times and building up especially considering your age, health status, and current state of fitness as shown in the graphic below. Of course if you find the start too slow you can advance as tolerated. The Finer Points: More Detail  Calf stretching is very important for a wide variety of people whether you have a problem or not. And there is absolutely no better or safer way to do calf stretching than on the One Stretch. The ultimate goal would be for us all to stretch our calves everyday and prevent the majority of foot and ankle problems before they occur. Now that would be a novel concept, wouldnt it. There are a whole variety of treatments for the problems that tight calves may cause, a few of which you may have already tried. These would include physical therapy, orthotics, rest, immobilization, injections, and non-steroidal anti-inflammatory medicines.  While these treatments may help the symptoms, none correct the actual cause, your calf that is too tight, and therefore, are usually not long-lasting. First of all, you must know that the primary cause of the majority of foot and ankle problems is due to our calves that have become too tight as we age. Usually the calf contracture is silent, so you are not aware. The calf muscle and Achilles tendon are a continuous structure on the back of the leg, which in turn causes increased stress on most areas of your foot and ankle when the calf is too tight. As people age, tightness (contracture) is almost inevitable. There are several reasons this occurs such as:  Decreased daily activities. As we become more sedentary, we have less daily stretch of the calf muscles. Age-related decrease in the elasticity of the calf connective tissue. Higher heeled shoes will put the calf in a shortened position and it tends to stay there. Athletes/runners calves shorten for reasons outside the scope of this handout. Our treatment is aimed at solving the primary problem of calf contractures with simple static calf stretching. In turn, your problem, which is due to or aggravated by calf contractures, will improve or resolve completely. Simple calf stretching is the treatment of choice and will generally obtain satisfactory to complete relief in better than 95% of the patients. Some patients may require as little as 2 weeks to see improvement while others may need upwards of 5-6 months to break through a long standing calf contracture. In the beginning the amount you stretch will vary due to pain or soreness of the calf muscles, heels, or other problem we are treating. This stretch will be done with both feet, therefore you may experience increased new pain in both heels, but this new pain will resolve as well. This stretching should be done three times a day; building up to three minutes of hang time for each session or set as detailed on the chart above.  For the best consistency do the stretching sessions like sets or in a cluster; do your first stretch session and take a break for 1-2 minutes (e.g., take your shower) then the next stretch session and so on. That way you are done for the day and you are less likely to miss. With your back against the wall and your knees straight, place the arch of your feet on the One Stretch (it is best to wear tennis shoes or rubber soled shoes in order to gain better traction to secure your feet) and slowly relax your ankles, letting your heels go downward. If you are in the correct position you should feel a pulling or tightness in your upper calf muscle, below the knee. It is OK to feel some mild pain. The amount of time you start at is dependent on your age, fitness, and any current health issues (see chart above). For instance, if you are young, fit, and healthy you might start at 3 minutes right away. Of course it is always best to use caution when choosing your starting point. Do the three sessions per day gradually increasing the amount of hang time. At times you may need to stay at the same amount of hang time for a few extra days. As you become accustomed to that particular amount of hang time you will need to increase it gradually in the same manner until you reach the maximum of 3 minutes each session. This is a gradual process and although you may want to get right to three minutes of hang time it may not be advisable to do so as you may cause yourself unnecessary pain. Also, keep the intensity of your stretch reasonable. A light to moderate stretch that you feel in your calf is as good as a hard intense stretch. The length of time you stretch has been found to be much more important than the intensity of the stretch. Be Patient! Dont over do it.   The majority of patients will experience new or a slight increase in pain somewhere between 2-6 weeks after beginning the stretching program. This is definitely expected and is usually only minor increase in pain and should not be excruciating. If it becomes severe, please consult your physician. If you continue the stretching program and work through this pain, it typically begins to resolve within a few weeks. As you progress with the stretching program, your response will have an up-and-down course (you will have some good days and bad days)--this is expected and normal. While not every patients symptoms are relieved completely, usually a satisfactory result is obtained within one to four months. Once you have obtained satisfactory relief, we recommend that the stretching is continued FOREVER.       ACHILLES TENDON STRETCHING PROGRAM      Week I - 15 seconds 3 times/day Week II - -30 seconds 3 times/day  Week III - -1 minute 3 times/day  Week IV - -1 ½ minutes 3 times/day  Week V - -2 minutes 3 times/day  Week VI - -2 ½ minutes 3 times/day  Week VII 3 minutes 3 times/day   for life, never stop                         Then maintenance of 3 minutes 1-2 times/day for life

## 2022-12-12 ENCOUNTER — OFFICE VISIT (OUTPATIENT)
Dept: ORTHOPEDIC SURGERY | Age: 57
End: 2022-12-12
Payer: COMMERCIAL

## 2022-12-12 VITALS — WEIGHT: 175.93 LBS | BODY MASS INDEX: 26.66 KG/M2 | HEIGHT: 68 IN

## 2022-12-12 DIAGNOSIS — M54.50 LOW BACK PAIN, UNSPECIFIED BACK PAIN LATERALITY, UNSPECIFIED CHRONICITY, UNSPECIFIED WHETHER SCIATICA PRESENT: ICD-10-CM

## 2022-12-12 DIAGNOSIS — M54.10 RADICULAR PAIN OF RIGHT LOWER EXTREMITY: ICD-10-CM

## 2022-12-12 DIAGNOSIS — M51.26 LUMBAR DISCOGENIC PAIN SYNDROME: ICD-10-CM

## 2022-12-12 PROCEDURE — 99204 OFFICE O/P NEW MOD 45 MIN: CPT | Performed by: INTERNAL MEDICINE

## 2022-12-12 RX ORDER — METHYLPREDNISOLONE 4 MG/1
TABLET ORAL
Qty: 1 KIT | Refills: 0 | Status: SHIPPED | OUTPATIENT
Start: 2022-12-12

## 2022-12-12 RX ORDER — TIZANIDINE 4 MG/1
4 TABLET ORAL 3 TIMES DAILY PRN
Qty: 30 TABLET | Refills: 1 | Status: SHIPPED | OUTPATIENT
Start: 2022-12-12

## 2022-12-12 NOTE — PROGRESS NOTES
Chief Complaint:   Chief Complaint   Patient presents with    Lower Back Pain     pt reports chronic consistent R sided LBP with R leg and foot numbness, fell in May, worse with prolonged standing          History of Present Illness:       Patient is a 61 y.o. female presents with the above complaint. The symptoms began  greater than 1  yearsago and started without an injury but recently aggravated by a fall having occurred in May 2022. The patient describes aching pain that does radiate. The symptoms are intermittent  and are are worsening since the onset. The symptoms of back pain doshow a discogenic provacative pattern and are worsened by sitting and squatting  and improved with walking and shifting position . There is not new onset weakness or progressive weakness of the lower extremities that has developed. The patient denies new onset bowel or bladder dysfunction. There  is no history of previous spinal trauma. The patient does not have history or orthopaedic lumbar spine surgery. Pain localizes to the lumbar region    Painl levels: 2    There is lower limb pain . The back pain : limb pain is 50 : 50 and follows a L5-S1 dermatomal distribution involving Right lower extremity radiating into the foot and ankle. Work-up to date has included:  None  Prior treatment has included NSAID-meloxicam. This patient reports some improvement with this treatment. The patient has no history or autoimmune disease, inflammatory arthropathy or crystal arthropathy.     .       Past Medical History:        Past Medical History:   Diagnosis Date    Hx of blood clots     PE x 2, last one jan 2018    Prothrombin gene mutation [facility])          Past Surgical History:   Procedure Laterality Date    ANKLE SURGERY      APPENDECTOMY      HYSTERECTOMY, TOTAL ABDOMINAL (CERVIX REMOVED) N/A 12/16/2019    OPEN REMOVAL OF PELVIC MASS, HYSTERECTOMY, RIGHT SALPINGO- OOPHORECTOMY, LEFT SALPINGECTOMY, APPENDECTOMY performed by Anita Pacheco MD at [address] [facility]      TONSILLECTOMY           Present Medications:         Current Outpatient Medications   Medication Sig Dispense Refill    methylPREDNISolone (MEDROL, SELENE,) 4 MG tablet By mouth. 1 kit 0    tiZANidine (ZANAFLEX) 4 MG tablet Take 1 tablet by mouth 3 times daily as needed (Muscle tightness/spasm) 30 tablet 1    albuterol sulfate HFA (PROVENTIL;VENTOLIN;PROAIR) 108 (90 Base) MCG/ACT inhaler       aspirin 81 MG EC tablet Take 243 mg by mouth in the morning. Takes 3 tablets daily. meloxicam (MOBIC) 7.5 MG tablet Take 1 tablet by mouth in the morning. 30 tablet 0    metroNIDAZOLE (METROGEL) 0.75 % gel Apply topically 2 times daily. 45 g 0     No current facility-administered medications for this visit. Allergies:         Allergies   Allergen Reactions    Latex      gloves    Zithromax [Azithromycin] Other (See Comments)     Ayala-jie syndrome        Social History:         Social History     Socioeconomic History    Marital status: Single     Spouse name: Not on file    Number of children: Not on file    Years of education: Not on file    Highest education level: Not on file   Occupational History    Not on file   Tobacco Use    Smoking status: Never    Smokeless tobacco: Never   Vaping Use    Vaping Use: Never used   Substance and Sexual Activity    Alcohol use: Yes     Comment: occassional    Drug use: Never    Sexual activity: Not Currently   Other Topics Concern    Not on file   Social History Narrative    Not on file     Social Determinants of Health     Financial Resource Strain: Low Risk     Difficulty of Paying Living Expenses: Not hard at all   Food Insecurity: No Food Insecurity    Worried About Running Out of Food in the Last Year: Never true    Ran Out of Food in the Last Year: Never true   Transportation Needs: Not on file   Physical Activity: Not on file   Stress: Not on file   Social Connections: Not on file   Intimate Partner Violence: Not on file   Housing Stability: Not on file        Review of Symptoms:    Pertinent items are noted in HPI    Review of systems reviewed from Patient History Form dated on today's date and   available in the patient's chart under the Media tab. Vital Signs: There were no vitals filed for this visit. General Exam:     Constitutional: Patient is adequately groomed with no evidence of malnutrition  Mental Status: The patient is oriented to time, place and person. The patient's mood and affect are appropriate. Vascular: Examination reveals no swelling or calf tenderness. Peripheral pulses are palpable and 2+. Lymphatics: no lymphadenopathy of the inguinal region or lower extremity      Physical Exam: lower back      Primary Exam:    Inspection: No deformity atrophy appreciable curvature      Palpation: No focal trigger point tenderness      Range of Motion: 90/5 pain with extension      Strength: Normal lower extremity      Special Tests: Negative SLR      Skin: There are no rashes, ulcerations or lesions. Gait: Nonantalgic      Reflex intact lower     Additional Comments:        Additional Examinations:           Neurolgic -Light touch sensation and manual muscle testing normal L2-S1. No fasiculations. Pattella tendon and Achilles tendon reflexes +2 bilaterally. Seated SLR negative           Office Imaging Results/Procedures PerformedToday:      Radiology:      X-rays obtained and reviewed in office:   Views 3 views lumbar spine   Location lumbar spine   Impression normal alignment AP projection there is at least mild intervertebral to space narrowing at L5/S1 with moderate facet arthropathy L4-S1 and mild multilevel spondylosis.   Vertebral body heights are well-maintained no other soft tissue or osseous abnormality       Office Procedures:     Orders Placed This Encounter   Procedures    XR LUMBAR SPINE (2-3 VIEWS)     Standing Status:   Future     Number of Occurrences:   1 Standing Expiration Date:   12/12/2023     Order Specific Question:   Reason for exam:     Answer:   back pain    MRI LUMBAR SPINE WO CONTRAST     [address], please contact pt to schedule, [address] will obtain auth and fwd to your facility. Pt advised to f/u in clinic 2-3 days after MRI for results. Standing Status:   Future     Standing Expiration Date:   12/12/2023     Order Specific Question:   Reason for exam:     Answer:   R radic, r/o HNP, stenosis     Order Specific Question:   What is the sedation requirement? Answer:   None           Other Outside Imaging and Testing Personally Reviewed:    XR LUMBAR SPINE (2-3 VIEWS)    Result Date: 12/12/2022  Radiology exam is complete. No Radiologist dictation. Please follow up with ordering provider. XR ANKLE RIGHT (MIN 3 VIEWS)    Result Date: 12/8/2022  Radiology exam is complete. No Radiologist dictation. Please follow up with ordering provider. XR FOOT RIGHT (MIN 3 VIEWS)    Result Date: 12/8/2022  Radiology exam is complete. No Radiologist dictation. Please follow up with ordering provider. Assessment   Impression: . Encounter Diagnoses   Name Primary? Lumbar discogenic pain syndrome     Radicular pain of right lower extremity     Low back pain, unspecified back pain laterality, unspecified chronicity, unspecified whether sciatica present               Plan:       MRI lumbar spine evaluate severity of discopathy/ stenosis suspected clinically  Consider Her a candidate for spine intervention injection  Activity modification, lumbar spine precautions  lumbar spinestabilization home exercise program  Medical pain management: muscle relaxant: Zanaflex, Medrol           The nature of the finding, probable diagnosis and likely treatment was thoroughly discussed with the patient. The options, risks, complications, alternative treatment as well as some of the differential diagnosis was discussed.  The patient was thoroughly informed and all questions were answered. the patient indicated understanding and satisfaction with the discussion. Orders:        Orders Placed This Encounter   Procedures    XR LUMBAR SPINE (2-3 VIEWS)     Standing Status:   Future     Number of Occurrences:   1     Standing Expiration Date:   12/12/2023     Order Specific Question:   Reason for exam:     Answer:   back pain    MRI LUMBAR SPINE WO CONTRAST     [address], please contact pt to schedule, [address] [facility] will obtain auth and fwd to your facility. Pt advised to f/u in clinic 2-3 days after MRI for results. Standing Status:   Future     Standing Expiration Date:   12/12/2023     Order Specific Question:   Reason for exam:     Answer:   R radic, r/o HNP, stenosis     Order Specific Question:   What is the sedation requirement? Answer:   None           Disclaimer: \"This note was dictated with voice recognition software. Though review and correction are routine, we apologize for any errors. \"

## 2022-12-22 ENCOUNTER — PROCEDURE VISIT (OUTPATIENT)
Dept: AUDIOLOGY | Age: 57
End: 2022-12-22
Payer: COMMERCIAL

## 2022-12-22 DIAGNOSIS — R42 DIZZINESS AND GIDDINESS: ICD-10-CM

## 2022-12-22 DIAGNOSIS — H93.8X3 EAR PRESSURE, BILATERAL: ICD-10-CM

## 2022-12-22 DIAGNOSIS — H90.3 SENSORINEURAL HEARING LOSS, BILATERAL: Primary | ICD-10-CM

## 2022-12-22 PROCEDURE — 92567 TYMPANOMETRY: CPT | Performed by: AUDIOLOGIST

## 2022-12-22 PROCEDURE — 92557 COMPREHENSIVE HEARING TEST: CPT | Performed by: AUDIOLOGIST

## 2022-12-22 NOTE — PATIENT INSTRUCTIONS
Good Communication Strategies    Communication can be challenging for anyone, but can be especially difficult for those with some degree of hearing loss. While we may not be able to control every factor that may lead to difficulty with communication, there are Good Communication Strategies that we can all use in our day-to-day lives. Communication takes both parties working together for it to be successful. Tips as a Listener:   Control your environment. It is important to limit the amount of background noise in the room when possible. You should also consider having a good light source in the room to best see the other person. Ask for clarification. Instead of saying \"What?\", you can use parts of what you heard to make a new question. For example, if you heard the word \"Thursday\" but not the rest of the week, you may ask \"What was that about Thursday? \" or \"What did you want to do Thursday? \". This shows the person talking that you are listening and will help them better explain what they are saying. Be an advocate for yourself. If you are hearing but not understanding, tell the other person \"I can hear you, but I need you to slow down when you speak. \"  Or if someone is facing the other direction, say \"I cannot hear you when you are not looking at me when we talk. \"       Tips as a Talker:   - Sit or stand 3 to 6 feet away to maximize audibility         -- It is unrealistic to believe someone else will fully hear your message if you are speaking from across the room or in a different room in the house   - Stay at eye level to help with visual cues   - Make sure you have the persons attention before speaking   - Use facial expressions and gestures to accentuate your message   - Raise your voice slightly (do not scream)   - Speak slowly and distinctly   - Use short, simple sentences   - Rephrase your words if the person is having a hard time understanding you    - To avoid distortion, dont speak directly into a persons ear      Some additional items that may be helpful:   - Remain patient - this is important for both parties   - Write down items that still cannot be heard/understood. You may write with pen/paper or consider typing/texting on a cell phone or smart device. - If background noise is unavoidable, try to keep yourself in a good position in the room. By sitting at a [place] on the side of the restaurant (preferably a corner), it will be easier to communicate than if you were sitting at a table in the middle with background noise surrounding you. Keep yourself positioned away from music speakers or heavy foot traffic.   - If you have difficulty with the television, consider these options:      -- Use closed-captioning, which is a setting you can turn on that displays the spoken words in a written form on the screen. There may be a slight delay, but this can help fill in missing information. This can be especially helpful when watching programs with accented speech. -- Consider use of a sound bar or speakers that come from the front of the TV. With modern flat screen TVs, many of them have speakers that come out of the back of the device, which makes sound bounce off the wall behind it, then go into the room. Sound bars can allow the sound to go straight in your direction and can improve sound quality. -- Consider ear level devices to help improve the volume and/or sound quality of the program.  There are devices that work like headphones that you can adjust the volume for your ears while others can have the volume at a more comfortable level, such as \"TV Ears\". Most hearing aids have devices that allow them to connect directly to the TV and improve sound quality. Hearing Loss: Care Instructions  Your Care Instructions      Hearing loss is a sudden or slow decrease in how well you hear. It can range from mild to profound.  Permanent hearing loss can occur with aging, and it can happen when you are exposed long-term to loud noise. Examples include listening to loud music, riding motorcycles, or being around other loud machines. Hearing loss can affect your work and home life. It can make you feel lonely or depressed. You may feel that you have lost your independence. But hearing aids and other devices can help you hear better and feel connected to others. Follow-up care is a key part of your treatment and safety. Be sure to make and go to all appointments, and call your doctor if you are having problems. It's also a good idea to know your test results and keep a list of the medicines you take. How can you care for yourself at home? Avoid loud noises whenever possible. This helps keep your hearing from getting worse. Always wear hearing protection around loud noises. If appropriate, wear hearing aid(s) as directed. It is recommended that hearing aids are worn during all waking hours to keep your brain active and give it access to the sounds it is missing. If you are beginning your process with hearing aid(s), schedule a \"Hearing Aid Evaluation\" with an audiologist to discuss your lifestyle, features of hearing aid technology, and styles of hearing aids available. It is recommended that you contact your insurance company to determine if you have a hearing aid benefit, as this may dictate who you can see for these services. Have hearing tests as your doctor suggests. They can show whether your hearing has changed. Your hearing aid may need to be adjusted. Use other assistive devices as needed. These may include:  Telephone amplifiers and hearing aids that can connect to a television, stereo, radio, or microphone. Devices that use lights or vibrations. These alert you to the doorbell, a ringing telephone, or a baby monitor. Television closed-captioning. This shows the words at the bottom of the screen. Most new TVs can do this. TTY (text telephone).  This lets you type messages back and forth on the telephone instead of talking or listening. These devices are also called TDD. When messages are typed on the keyboard, they are sent over the phone line to a receiving TTY. The message is shown on a monitor. Use pagers, fax machines, text, and email if it is hard for you to communicate by telephone. Try to learn a listening technique called speech-reading. It is not lip-reading. You pay attention to people's gestures, expressions, posture, and tone of voice. These clues can help you understand what a person is saying. Face the person you are talking to, and have him or her face you. Make sure the lighting is good. You need to see the other person's face clearly. Think about counseling if you need help to adjust to your hearing loss. When should you call for help? Watch closely for changes in your health, and be sure to contact your doctor if:    You think your hearing is getting worse. You have new symptoms, such as dizziness or nausea.

## 2022-12-22 NOTE — Clinical Note
Dr. Raji Campbell,  Thank you for your referral for audiologic testing on this patient. The results are consistent with bilateral low frequency sensorineural hearing loss rising to within normal limits with normal middle ear function and excellent word recognition for conversational speech bilaterally. Hearing loss is significant enough to result in difficulty understanding speech in at least some listening environments. Discussed good communication strategies and considerations for amplification. Recommended ENT evaluation given concerns of ear pressure and itchiness, as well as dizziness. If you have any questions, or if there is anything else you need, please let me know.    Fl[place],  [address], [place] Audiologist --- [address] [facility] - Audiology

## 2022-12-22 NOTE — PROGRESS NOTES
Cee Azevedo   1965, 61 y.o. female   [id number]       Referring Provider: Bessy Donis MD   Referral Type: In an order in [facility]    Reason for Visit: Evaluation of suspected change in hearing, tinnitus, or balance. ADULT AUDIOLOGIC EVALUATION      Cee Azevedo is a 61 y.o. female seen today, 12/22/2022, for an initial audiologic evaluation. AUDIOLOGIC AND OTHER PERTINENT MEDICAL HISTORY:        Cee Azevedo noted decreased hearing bilaterally, difficulty hearing in classroom environment; itching in her ears, very bothersome; ear pressure bilaterally, popping; dizziness, worse since having COVID earlier this year. Cee Azevedo denied otalgia, otorrhea, persistent tinnitus, history of occupational/recreational noise exposure, history of head trauma, history of ear surgery, and family history of early onset hearing loss. IMPRESSIONS:       Today's results are consistent with bilateral low frequency sensorineural hearing loss rising to within normal limits with normal middle ear function and excellent word recognition for conversational speech bilaterally. Hearing loss is significant enough to result in difficulty understanding speech in at least some listening environments. Discussed good communication strategies and considerations for amplification. Recommended ENT evaluation given concerns of ear pressure and itchiness, as well as dizziness. ASSESSMENT AND FINDINGS:       Otoscopy revealed: Clear ear canals bilaterally      RIGHT EAR:  Hearing Sensitivity: Mild sensorineural hearing loss rising to within normal limits 7415-2469 Hz. Speech Recognition Threshold: 30 dBHL  Word Recognition: Excellent (96%), based on NU-6 25-word list at 50 dBHL using recorded speech stimuli. Tympanometry: Normal peak pressure and compliance, Type A tympanogram, consistent with normal middle ear function.       LEFT EAR:  Hearing Sensitivity: Mild to moderate sensorineural hearing loss rising to within normal limits 1316-9754 Hz. Speech Recognition Threshold: 35 dBHL  Word Recognition: Excellent (96%), based on NU-6 25-word list at 50 dBHL using recorded speech stimuli. Tympanometry: Normal peak pressure and compliance, Type A tympanogram, consistent with normal middle ear function. Reliability: Good  Transducer: Inserts    See scanned audiogram dated 12/22/2022 for results. PATIENT EDUCATION:       The following items were discussed with the patient:   - Good Communication Strategies  - Hearing Loss and Hearing Aids    Educational information was shared in the After Visit Summary. RECOMMENDATIONS:                                                                                                                                                                                                                                                                      The following items are recommended based on patient report and results from today's appointment:  - Continue medical follow-up with Tanika Russell MD.  - Recommended ENT evaluation due to concerns for ear itching, ear fullness/popping, and dizziness.  - Retest hearing as medically indicated and/or sooner if a change in hearing is noted. - If desired, schedule a Hearing Aid Evaluation (HAE) appointment to discuss hearing aid options. - Utilize \"Good Communication Strategies\" as discussed to assist in speech understanding with communication partners.          [facility] [place], [place]  Audiologist       Chart CC'd to: Tanika Russell MD       Degree of   Hearing Sensitivity dB Range   Within Normal Limits (WNL) 0 - 20   Mild 20 - 40   Moderate 40 - 55   Moderately-Severe 55 - 70   Severe 70 - 90   Profound 90 +

## 2023-01-11 ENCOUNTER — OFFICE VISIT (OUTPATIENT)
Dept: ORTHOPEDIC SURGERY | Age: 58
End: 2023-01-11
Payer: COMMERCIAL

## 2023-01-11 VITALS — BODY MASS INDEX: 26.66 KG/M2 | WEIGHT: 175.93 LBS | HEIGHT: 68 IN

## 2023-01-11 DIAGNOSIS — Z86.2: ICD-10-CM

## 2023-01-11 DIAGNOSIS — M51.36 DDD (DEGENERATIVE DISC DISEASE), LUMBAR: ICD-10-CM

## 2023-01-11 DIAGNOSIS — Z86.711 HISTORY OF PULMONARY EMBOLISM: ICD-10-CM

## 2023-01-11 DIAGNOSIS — M51.27 HERNIATION OF INTERVERTEBRAL DISC BETWEEN L5 AND S1: ICD-10-CM

## 2023-01-11 DIAGNOSIS — M54.10 RADICULAR PAIN OF RIGHT LOWER EXTREMITY: ICD-10-CM

## 2023-01-11 DIAGNOSIS — R10.31 RIGHT LOWER QUADRANT ABDOMINAL PAIN: ICD-10-CM

## 2023-01-11 DIAGNOSIS — M47.816 LUMBAR SPONDYLOSIS: ICD-10-CM

## 2023-01-11 PROCEDURE — 99214 OFFICE O/P EST MOD 30 MIN: CPT | Performed by: INTERNAL MEDICINE

## 2023-01-11 NOTE — PROGRESS NOTES
Chief Complaint:   Chief Complaint   Patient presents with    Lower Back Pain     F/U Lumbar, MRI TR, no change since last visit.           History of Present Illness:       Patient is a 57 y.o. female returns follow up for the above complaint. The patient was last seen approximately 1 monthsago. The symptoms show no change since the last visit. The patient has had further testing for the problem.      MRI completed in the interim.    She elected not to take the course of steroids-Medrol    Back: Right leg pain 70:30 . Pain back and leg is aching or stabbing in quality.  Lower limb pain on the left involves the posterior lateral thigh without peripheralization.  Superimposed on this is bilateral heel pain she attributes to plantar fasciitis described as \"burning\".    The symptoms do not follow a discogenic provocative pattern.  Standing better tolerated than sitting.    Pain levels:2.    The patient denies new onset or progressive weakness of the lower extremities.  The patient denies now onset bowel or bladder function.    She continues on medical pain management as per previous  inclusive of muscle relaxant-Zanaflex as needed    In the interim she has developed some pain in the groin and anterior thigh and she is concerned that this may be representative of a \"blood clot\".  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 Takes 3 tablets daily. meloxicam (MOBIC) 7.5 MG tablet Take 1 tablet by mouth in the morning. 30 tablet 0    metroNIDAZOLE (METROGEL) 0.75 % gel Apply topically 2 times daily. (Patient not taking: Reported on 1/12/2023) 45 g 0     No current facility-administered medications for this visit. Allergies: Allergies   Allergen Reactions    Latex      gloves    Zithromax [Azithromycin] Other (See Comments)     Ayala-jie syndrome           Review of Systems:    Pertinent items are noted in HPI   Vital Signs: There were no vitals filed for this visit. General Exam:     Constitutional: Patient is adequately groomed with no evidence of malnutrition    Physical Exam: lower back      Primary Exam:    Inspection: No deformity atrophy appreciable curvature      Palpation: No focal trigger point tenderness      Range of Motion: 80/15 pain with flexion      Strength: Normal lower extremity      Special Tests: Negative SLR      Skin: There are no rashes, ulcerations or lesions. Gait: Nonantalgic     Neurovascular - non focal and intact; there is no lower extremity edema involving the lower extremities       Additional Comments:        Additional Examinations:                    Office Imaging Results/Procedures PerformedToday:           Office Procedures:     Orders Placed This Encounter   Procedures    D-Dimer, Quantitative     Standing Status:   Future     Number of Occurrences:   1     Standing Expiration Date:   1/11/2024    [address] [facility]     Referral Priority:   Routine     Referral Type:   Eval and Treat     Referral Reason:   Specialty Services Required     Requested Specialty:   Physical Therapist     Number of Visits Requested:   1           Other Outside Imaging and Testing Personally Reviewed:            Assessment   Impression: . Encounter Diagnoses   Name Primary? Herniation of intervertebral disc between L5 and S1     DDD (degenerative disc disease), lumbar     Lumbar spondylosis     Radicular pain of right lower extremity     Hx of prothrombin mutation     History of pulmonary embolism               Plan:       Formal course of PT lumbar stabilization/Alicia program  D-dimer stat serologic testing low to intermediate probability for DVT/VTE event  Hold any spine intervention injection considerations at this time  Complete the course of Medrol as prescribed at her discretion  As needed use of OTC NSAIDs with GI precaution impairing use of Zanaflex  Active modification lumbar spine precautions and continue lumbar stabilization home exercise program    Her back pain may well be discogenic in etiology proceed as outlined above         Orders:        Orders Placed This Encounter   Procedures    D-Dimer, Quantitative     Standing Status:   Future     Number of Occurrences:   1     Standing Expiration Date:   1/11/2024    [address] [facility]     Referral Priority:   Routine     Referral Type:   Eval and Treat     Referral Reason:   Specialty Services Required     Requested Specialty:   Physical Therapist     Number of Visits Requested:   1         Yu Chakraborty MD.      Robb Borrero: \"This note was dictated with voice recognition software. Though review and correction are routine, we apologize for any errors. \"

## 2023-01-12 ENCOUNTER — TELEPHONE (OUTPATIENT)
Dept: ORTHOPEDIC SURGERY | Age: 58
End: 2023-01-12

## 2023-01-12 ENCOUNTER — HOSPITAL ENCOUNTER (EMERGENCY)
Age: 58
Discharge: HOME | End: 2023-01-13
Attending: EMERGENCY MEDICINE
Payer: COMMERCIAL

## 2023-01-12 ENCOUNTER — HOSPITAL ENCOUNTER (OUTPATIENT)
Age: 58
Discharge: HOME | End: 2023-01-12
Payer: COMMERCIAL

## 2023-01-12 VITALS
BODY MASS INDEX: 27.58 KG/M2 | HEART RATE: 81 BPM | HEIGHT: 68 IN | RESPIRATION RATE: 16 BRPM | WEIGHT: 182 LBS | OXYGEN SATURATION: 93 % | SYSTOLIC BLOOD PRESSURE: 139 MMHG | DIASTOLIC BLOOD PRESSURE: 85 MMHG | TEMPERATURE: 98.4 F

## 2023-01-12 DIAGNOSIS — M79.605 LEFT LEG PAIN: Primary | ICD-10-CM

## 2023-01-12 DIAGNOSIS — D68.52 PROTHROMBIN GENE MUTATION (HCC): ICD-10-CM

## 2023-01-12 DIAGNOSIS — E04.1 THYROID NODULE: ICD-10-CM

## 2023-01-12 LAB
A/G RATIO: 1.6 (ref 1.1–2.2)
ALBUMIN SERPL-MCNC: 4.3 G/DL (ref 3.4–5)
ALP BLD-CCNC: 115 U/L (ref 40–129)
ALT SERPL-CCNC: 23 U/L (ref 10–40)
ANION GAP SERPL CALCULATED.3IONS-SCNC: 9 MMOL/L (ref 3–16)
ANION GAP SERPL CALCULATED.3IONS-SCNC: 9 MMOL/L (ref 3–16)
APTT: 30.8 SEC (ref 23–34.3)
AST SERPL-CCNC: 18 U/L (ref 15–37)
BASOPHILS ABSOLUTE: 0 K/UL (ref 0–0.2)
BASOPHILS ABSOLUTE: 0.1 K/UL (ref 0–0.2)
BASOPHILS RELATIVE PERCENT: 0.4 %
BASOPHILS RELATIVE PERCENT: 0.8 %
BILIRUB SERPL-MCNC: <0.2 MG/DL (ref 0–1)
BUN BLDV-MCNC: 15 MG/DL (ref 7–20)
BUN BLDV-MCNC: 16 MG/DL (ref 7–20)
CALCIUM SERPL-MCNC: 9.4 MG/DL (ref 8.3–10.6)
CALCIUM SERPL-MCNC: 9.5 MG/DL (ref 8.3–10.6)
CHLORIDE BLD-SCNC: 102 MMOL/L (ref 99–110)
CHLORIDE BLD-SCNC: 103 MMOL/L (ref 99–110)
CO2: 29 MMOL/L (ref 21–32)
CO2: 31 MMOL/L (ref 21–32)
CREAT SERPL-MCNC: 0.8 MG/DL (ref 0.6–1.1)
CREAT SERPL-MCNC: 1 MG/DL (ref 0.6–1.1)
D DIMER: 0.65 UG/ML FEU (ref 0–0.6)
EOSINOPHILS ABSOLUTE: 0.1 K/UL (ref 0–0.6)
EOSINOPHILS ABSOLUTE: 0.1 K/UL (ref 0–0.6)
EOSINOPHILS RELATIVE PERCENT: 1.3 %
EOSINOPHILS RELATIVE PERCENT: 1.3 %
GFR SERPL CREATININE-BSD FRML MDRD: >60 ML/MIN/{1.73_M2}
GFR SERPL CREATININE-BSD FRML MDRD: >60 ML/MIN/{1.73_M2}
GLUCOSE BLD-MCNC: 108 MG/DL (ref 70–99)
GLUCOSE BLD-MCNC: 96 MG/DL (ref 70–99)
HCT VFR BLD CALC: 39.3 % (ref 36–48)
HCT VFR BLD CALC: 42.3 % (ref 36–48)
HEMOGLOBIN: 13.4 G/DL (ref 12–16)
HEMOGLOBIN: 13.9 G/DL (ref 12–16)
INR BLD: 0.93 (ref 0.87–1.14)
LYMPHOCYTES ABSOLUTE: 1.8 K/UL (ref 1–5.1)
LYMPHOCYTES ABSOLUTE: 2.5 K/UL (ref 1–5.1)
LYMPHOCYTES RELATIVE PERCENT: 24.9 %
LYMPHOCYTES RELATIVE PERCENT: 33.1 %
MCH RBC QN AUTO: 31.1 PG (ref 26–34)
MCH RBC QN AUTO: 32.2 PG (ref 26–34)
MCHC RBC AUTO-ENTMCNC: 32.9 G/DL (ref 31–36)
MCHC RBC AUTO-ENTMCNC: 34 G/DL (ref 31–36)
MCV RBC AUTO: 94.6 FL (ref 80–100)
MCV RBC AUTO: 94.6 FL (ref 80–100)
MONOCYTES ABSOLUTE: 0.6 K/UL (ref 0–1.3)
MONOCYTES ABSOLUTE: 0.7 K/UL (ref 0–1.3)
MONOCYTES RELATIVE PERCENT: 7.8 %
MONOCYTES RELATIVE PERCENT: 9.1 %
NEUTROPHILS ABSOLUTE: 4.3 K/UL (ref 1.7–7.7)
NEUTROPHILS ABSOLUTE: 4.8 K/UL (ref 1.7–7.7)
NEUTROPHILS RELATIVE PERCENT: 56.1 %
NEUTROPHILS RELATIVE PERCENT: 65.2 %
PDW BLD-RTO: 13.3 % (ref 12.4–15.4)
PDW BLD-RTO: 13.3 % (ref 12.4–15.4)
PLATELET # BLD: 279 K/UL (ref 135–450)
PLATELET # BLD: 307 K/UL (ref 135–450)
PMV BLD AUTO: 9 FL (ref 5–10.5)
PMV BLD AUTO: 9.4 FL (ref 5–10.5)
POTASSIUM REFLEX MAGNESIUM: 3.8 MMOL/L (ref 3.5–5.1)
POTASSIUM SERPL-SCNC: 4.3 MMOL/L (ref 3.5–5.1)
PROTHROMBIN TIME: 12.4 SEC (ref 11.7–14.5)
RBC # BLD: 4.16 M/UL (ref 4–5.2)
RBC # BLD: 4.48 M/UL (ref 4–5.2)
REASON FOR REJECTION: NORMAL
REJECTED TEST: NORMAL
SODIUM BLD-SCNC: 140 MMOL/L (ref 136–145)
SODIUM BLD-SCNC: 143 MMOL/L (ref 136–145)
TOTAL PROTEIN: 7 G/DL (ref 6.4–8.2)
TROPONIN: <0.01 NG/ML
WBC # BLD: 7.3 K/UL (ref 4–11)
WBC # BLD: 7.6 K/UL (ref 4–11)

## 2023-01-12 PROCEDURE — 80053 COMPREHEN METABOLIC PANEL: CPT

## 2023-01-12 PROCEDURE — 84484 ASSAY OF TROPONIN QUANT: CPT

## 2023-01-12 PROCEDURE — 99283 EMERGENCY DEPT VISIT LOW MDM: CPT

## 2023-01-12 PROCEDURE — 85379 FIBRIN DEGRADATION QUANT: CPT

## 2023-01-12 PROCEDURE — 85730 THROMBOPLASTIN TIME PARTIAL: CPT

## 2023-01-12 PROCEDURE — 85025 COMPLETE CBC W/AUTO DIFF WBC: CPT

## 2023-01-12 PROCEDURE — 80048 BASIC METABOLIC PNL TOTAL CA: CPT

## 2023-01-12 PROCEDURE — 85610 PROTHROMBIN TIME: CPT

## 2023-01-12 PROCEDURE — 36415 COLL VENOUS BLD VENIPUNCTURE: CPT

## 2023-01-12 PROCEDURE — 93005 ELECTROCARDIOGRAM TRACING: CPT | Performed by: PHYSICIAN ASSISTANT

## 2023-01-12 ASSESSMENT — ENCOUNTER SYMPTOMS
VOMITING: 0
SHORTNESS OF BREATH: 1
ABDOMINAL PAIN: 0
EYE REDNESS: 0
NAUSEA: 0

## 2023-01-12 ASSESSMENT — PAIN DESCRIPTION - ORIENTATION: ORIENTATION: LEFT

## 2023-01-12 ASSESSMENT — PAIN DESCRIPTION - LOCATION: LOCATION: GROIN;LEG

## 2023-01-12 ASSESSMENT — PAIN DESCRIPTION - PAIN TYPE: TYPE: ACUTE PAIN

## 2023-01-12 ASSESSMENT — PAIN SCALES - GENERAL: PAINLEVEL_OUTOF10: 3

## 2023-01-12 ASSESSMENT — PAIN - FUNCTIONAL ASSESSMENT: PAIN_FUNCTIONAL_ASSESSMENT: 0-10

## 2023-01-12 ASSESSMENT — PAIN DESCRIPTION - FREQUENCY: FREQUENCY: CONTINUOUS

## 2023-01-12 ASSESSMENT — PAIN DESCRIPTION - ONSET: ONSET: AWAKENED FROM SLEEP

## 2023-01-12 NOTE — TELEPHONE ENCOUNTER
Dr. Mariely Rios S/W the pt this morning to let her know that she would need to go to the lab to be redrawn.

## 2023-01-13 ENCOUNTER — APPOINTMENT (OUTPATIENT)
Dept: CT IMAGING | Age: 58
End: 2023-01-13
Payer: COMMERCIAL

## 2023-01-13 ENCOUNTER — HOSPITAL ENCOUNTER (OUTPATIENT)
Dept: VASCULAR LAB | Age: 58
Discharge: HOME | End: 2023-01-13
Payer: COMMERCIAL

## 2023-01-13 LAB
EKG ATRIAL RATE: 78 BPM
EKG DIAGNOSIS: NORMAL
EKG P AXIS: 55 DEGREES
EKG P-R INTERVAL: 116 MS
EKG Q-T INTERVAL: 376 MS
EKG QRS DURATION: 80 MS
EKG QTC CALCULATION (BAZETT): 428 MS
EKG R AXIS: 26 DEGREES
EKG T AXIS: 27 DEGREES
EKG VENTRICULAR RATE: 78 BPM

## 2023-01-13 PROCEDURE — 93971 EXTREMITY STUDY: CPT

## 2023-01-13 PROCEDURE — 6360000004 HC RX CONTRAST MEDICATION: Performed by: PHYSICIAN ASSISTANT

## 2023-01-13 PROCEDURE — 71260 CT THORAX DX C+: CPT | Performed by: PHYSICIAN ASSISTANT

## 2023-01-13 PROCEDURE — 6370000000 HC RX 637 (ALT 250 FOR IP): Performed by: EMERGENCY MEDICINE

## 2023-01-13 RX ADMIN — APIXABAN 10 MG: 5 TABLET, FILM COATED ORAL at 00:59

## 2023-01-13 RX ADMIN — IOPAMIDOL 80 ML: 755 INJECTION, SOLUTION INTRAVENOUS at 00:14

## 2023-01-13 NOTE — DISCHARGE INSTRUCTIONS
Return tomorrow for your venous duplex. Take Eliquis as prescribed until results of your duplex are known. Follow-up with your primary care physician regarding thyroid nodule found incidentally on your CAT scan. Return to the emergency department with new or worsening symptoms.

## 2023-01-13 NOTE — ED NOTES
Patient prepared for and ready to be discharged. Patient discharged at this time to home in care of self in no acute distress after verbalizing understanding of discharge instructions. Patient left after receiving After Visit Summary instructions. IV removed prior to discharge.      Rashida Sanches RN  01/13/23 0841

## 2023-01-13 NOTE — ED PROVIDER NOTES
[facility]          NURSE PRACTITIONER NOTE       Date of evaluation: 1/12/2023    Chief Complaint     Other (Pt is concerned she may have a blood clot S/sx include SOB, Left sided groin pain, Leg pain. Pt states she has a clotting disorder and has a positive D-Dimer from [facility] that was drawn today.)      History of Present Illness     Carolina Duarte is a 61 y.o. female with PMH of Prothrombin mutation, PE/DVT not currently on anticoagulation who presents with left sided groin and posterior thigh pain, intermittent shooting pain right chest with palpitations and dyspnea on exertion for the past few days. Patient was at her Orthopedic surgeon office yesterday for chronic back pain and mentioned her symptoms. She was sent for a ddimer that resulted positive today. Thus, she was referred here to the ED. Patient reports that she is currently only taking an ASA daily (since 2021, stopped anticoagulation herself, does not follow with anyone for this problem currently). She denies recent travel, surgeries. Reports she has not been as active recently due to pain from plantar fascitis in her right foot. ASSESSMENT / PLAN  (MEDICAL DECISION MAKING)     INITIAL VITALS: BP: 139/85, Temp: 98.4 °F (36.9 °C), Heart Rate: 81, Resp: 16, SpO2: 93 %     Carolina Duarte is a 61 y.o. female with PMH of Prothrombin mutation, PE/DVT not currently on anticoagulation who presents with 2 days of left thigh pain, intermittent chest pain, palpitations, and dyspnea on exertion. Patient had an elevated ddimer on outpatient labs. On exam, patient is well-appearing with normal vital signs. Heart rhythm is regular. Lungs clear to auscultation. Abdomen soft and nontender. Lower extremities appear symmetric without appreciated swelling. There is tenderness to palpation of the left anterior groin, posterior thigh and calf.   EKG obtained revealed normal sinus rhythm without acute ischemic changes. Laboratory work-up unremarkable, negative troponin. CTPA revealed no pulmonary embolism. Incidental 2.6 cm thyroid nodule discussed with patient and necessary follow up. Given elevated ddimer in setting of left leg pain, patient will require venous duplex of the LLE to r/o DVT. Unfortunately it is after hours. Will plan to provide patient dose of Eliquis with starter pack and have her return in the AM for venous duplex. At this time, patient is stable for discharge. Medical Decision Making  Problems Addressed:  Left leg pain: acute illness or injury  Prothrombin gene mutation [facility]): chronic illness or injury  Thyroid nodule: undiagnosed new problem with uncertain prognosis    Amount and/or Complexity of Data Reviewed  Labs: ordered. Decision-making details documented in ED Course. Radiology: ordered. Decision-making details documented in ED Course. ECG/medicine tests: ordered and independent interpretation performed. Decision-making details documented in ED Course. Risk  Prescription drug management. This patient was also evaluated by the attending physician. All care plans werediscussed and agreed upon. Clinical Impression     1. Left leg pain    2. Prothrombin gene mutation ([facility] [address]    3. Thyroid nodule        Disposition     PATIENT REFERRED TO:  Juan Trimble MD  [address] [place] [address]  [phone]    Schedule an appointment as soon as possible for a visit   regarding further evaluation of your thyroid nodule    [facility] Emergency Department  [address] [place] [id number]  [phone]    As needed, If symptoms worsen    DISCHARGE MEDICATIONS:  New Prescriptions    No medications on file       DISPOSITION Decision To Discharge 01/13/2023 12:43:02 AM        Diagnostic Results And Other Data         RADIOLOGY:  CT CHEST PULMONARY EMBOLISM W CONTRAST   Final Result      1. No pulmonary embolism. No acute airspace disease.    2. 2.6 cm thyroid lobe nodule. Recommend dedicated thyroid ultrasound and potential FNA. VL Extremity Venous Left    (Results Pending)       LABS:   Results for orders placed or performed during the hospital encounter of 01/12/23   BMP w/ Reflex to MG   Result Value Ref Range    Sodium 140 136 - 145 mmol/L    Potassium reflex Magnesium 3.8 3.5 - 5.1 mmol/L    Chloride 102 99 - 110 mmol/L    CO2 29 21 - 32 mmol/L    Anion Gap 9 3 - 16    Glucose 96 70 - 99 mg/dL    BUN 16 7 - 20 mg/dL    Creatinine 0.8 0.6 - 1.1 mg/dL    Est, Glom Filt Rate >60 >60    Calcium 9.4 8.3 - 10.6 mg/dL   CBC with Auto Differential   Result Value Ref Range    WBC 7.6 4.0 - 11.0 K/uL    RBC 4.16 4.00 - 5.20 M/uL    Hemoglobin 13.4 12.0 - 16.0 g/dL    Hematocrit 39.3 36.0 - 48.0 %    MCV 94.6 80.0 - 100.0 fL    MCH 32.2 26.0 - 34.0 pg    MCHC 34.0 31.0 - 36.0 g/dL    RDW 13.3 12.4 - 15.4 %    Platelets 826 570 - 832 K/uL    MPV 9.4 5.0 - 10.5 fL    Neutrophils % 56.1 %    Lymphocytes % 33.1 %    Monocytes % 9.1 %    Eosinophils % 1.3 %    Basophils % 0.4 %    Neutrophils Absolute 4.3 1.7 - 7.7 K/uL    Lymphocytes Absolute 2.5 1.0 - 5.1 K/uL    Monocytes Absolute 0.7 0.0 - 1.3 K/uL    Eosinophils Absolute 0.1 0.0 - 0.6 K/uL    Basophils Absolute 0.0 0.0 - 0.2 K/uL   Troponin   Result Value Ref Range    Troponin <0.01 <0.01 ng/mL   Protime-INR   Result Value Ref Range    Protime 12.4 11.7 - 14.5 sec    INR 0.93 0.87 - 1.14   APTT   Result Value Ref Range    aPTT 30.8 23.0 - 34.3 sec     EKG   Interpreted in conjunction with emergency department physician Dr. Antwon East  Rhythm: normal sinus   Rate: normal  Axis: normal  Ectopy: none  Conduction: normal  ST Segments: no acute change  T Waves: no acute change  Q Waves: none  Clinical Impression: no acute changes  Comparison:  12/11/2019    ED BEDSIDE ULTRASOUND:  No results found.     MOST RECENT VITALS:  BP: 139/85, Temp: 98.4 °F (36.9 °C), Heart Rate: 81, Resp: 16, SpO2: 93 %     Procedures         ED Course     Nursing Notes, Past Medical Hx, Past Surgical Hx, Social Hx, Allergies, and Family Hx were reviewed. The patient was given the following medications:  Orders Placed This Encounter   Medications    iopamidol (ISOVUE-370) 76 % injection 80 mL    AND Linked Order Group     apixaban (ELIQUIS) tablet 10 mg      Order Specific Question:   Indication of Use      Answer:   Treatment-DVT/PE     apixaban (ELIQUIS) 5 mg tablets (ED 4 day apixaban DVT pack)      Order Specific Question:   Indication of Use      Answer:   Treatment-DVT/PE         CONSULTS:  None    Review of Systems     Review of Systems   Constitutional:  Negative for chills and fever. HENT:  Negative for congestion. Eyes:  Negative for redness. Respiratory:  Positive for shortness of breath. Cardiovascular:  Positive for chest pain and palpitations. Negative for leg swelling. Gastrointestinal:  Negative for abdominal pain, nausea and vomiting. Genitourinary:  Negative for difficulty urinating. Musculoskeletal:  Negative for gait problem. Left thigh and groin pain   Skin:  Negative for wound. Neurological:  Negative for dizziness, weakness and numbness. Psychiatric/Behavioral:  The patient is not nervous/anxious. Past Medical, Surgical, Family, and Social History     She has a past medical history of Hx of blood clots and Prothrombin gene mutation ([facility] [address] She has a past surgical history that includes ovarian cyst removal; Tonsillectomy; Mohs surgery; Ankle surgery; Hysterectomy, total abdominal (N/A, 12/16/2019); and Appendectomy. Her family history includes Cancer in her brother and father; Diabetes in her brother; High Blood Pressure in her mother and sister. She reports that she has never smoked. She has never used smokeless tobacco. She reports current alcohol use. She reports that she does not use drugs.     Medications     Previous Medications    ALBUTEROL SULFATE HFA (PROVENTIL;VENTOLIN;PROAIR) 108 (90 BASE) MCG/ACT INHALER        ASPIRIN 81 MG EC TABLET    Take 243 mg by mouth in the morning. Takes 3 tablets daily. MELOXICAM (MOBIC) 7.5 MG TABLET    Take 1 tablet by mouth in the morning. METHYLPREDNISOLONE (MEDROL, SELENE,) 4 MG TABLET    By mouth. METRONIDAZOLE (METROGEL) 0.75 % GEL    Apply topically 2 times daily. TIZANIDINE (ZANAFLEX) 4 MG TABLET    Take 1 tablet by mouth 3 times daily as needed (Muscle tightness/spasm)       Allergies     She is allergic to latex and zithromax [azithromycin]. Physical Exam     INITIAL VITALS: BP: 139/85, Temp: 98.4 °F (36.9 °C), Heart Rate: 81, Resp: 16, SpO2: 93 %   Physical Exam  Vitals and nursing note reviewed. Constitutional:       General: She is not in acute distress. HENT:      Head: Normocephalic and atraumatic. Eyes:      Extraocular Movements: Extraocular movements intact. Conjunctiva/sclera: Conjunctivae normal.   Cardiovascular:      Rate and Rhythm: Normal rate and regular rhythm. Pulmonary:      Effort: Pulmonary effort is normal. No respiratory distress. Breath sounds: Normal breath sounds. No wheezing, rhonchi or rales. Abdominal:      General: Bowel sounds are normal. There is no distension. Palpations: Abdomen is soft. Tenderness: There is no abdominal tenderness. There is no guarding or rebound. Musculoskeletal:         General: Tenderness (TTP left anterior groin, posterior thigh and posterior knee/calf) present. No deformity. Cervical back: Neck supple. Right lower leg: No edema. Left lower leg: No edema. Comments: Lower extremities appear symmetric in size without appreciated swelling. Skin:     General: Skin is warm and dry. Neurological:      Mental Status: She is alert and oriented to person, place, and time.    Psychiatric:         Mood and Affect: Mood normal.         Behavior: Behavior normal.             Susan York PA-C  01/13/23 0271

## 2023-01-18 ENCOUNTER — HOSPITAL ENCOUNTER (OUTPATIENT)
Dept: PHYSICAL THERAPY | Age: 58
Setting detail: THERAPIES SERIES
Discharge: HOME | End: 2023-01-18
Payer: COMMERCIAL

## 2023-01-18 DIAGNOSIS — M54.50 CHRONIC RIGHT-SIDED LOW BACK PAIN, UNSPECIFIED WHETHER SCIATICA PRESENT: Primary | ICD-10-CM

## 2023-01-18 DIAGNOSIS — G89.29 CHRONIC RIGHT-SIDED LOW BACK PAIN, UNSPECIFIED WHETHER SCIATICA PRESENT: Primary | ICD-10-CM

## 2023-01-18 PROCEDURE — 97162 PT EVAL MOD COMPLEX 30 MIN: CPT

## 2023-01-18 PROCEDURE — 97110 THERAPEUTIC EXERCISES: CPT

## 2023-01-18 NOTE — FLOWSHEET NOTE
[employer]    Physical Therapy Treatment Note/ Progress Report:     Date:  2023    Patient Name:  Helena Pinzon    :  1965  MRN: [id number]  Medical Diagnosis:  Hx of prothrombin mutation [Z86.2]  History of pulmonary embolism [Z86.711]  Herniation of intervertebral disc between L5 and S1 [M51.27]  Radicular pain of right lower extremity [M54.10]  DDD (degenerative disc disease), lumbar [M51.36]  Lumbar spondylosis [M47.816]  Treatment Diagnosis:    ICD-10-CM    1. Chronic right-sided low back pain, unspecified whether sciatica present  M54.50     G89.29         Insurance/Certification information:  PT Insurance Information: [employer] - $1250 deductible, $3000 OOP max, $0 co-pay, 90%/10% co-insurance, DN not covered, auth required after 5 visits  Physician Information:  [address] signed (Y/N): []  Yes [x]  No  Date sent:     Date of Patient follow up with Physician:      Progress Report: []  Yes  [x]  No     Functional Scale:    Date assessed:  SOLEDAD 32 % disability    23    Date Range for reporting period:  Beginnin23  Ending:      Progress report due (10 Rx/or 30 days whichever is less):      Recertification due (POC duration/ or 90 days whichever is less): 3/18/23     Visit # Insurance Allowable Auth required?  Date Range   1 MN - auth required after 5 visits [x]  Yes  []  No      Pain level:  2-3/10     SUBJECTIVE:  See eval    OBJECTIVE: See eval  Observation:   Test measurements:      RESTRICTIONS/PRECAUTIONS: clotting mutation, hx of hysterectomy, fall in May 2022, hx of R plantar fasciitis     Treatment based classification:     Exercises/Interventions:     Therapeutic Ex 10' Wt / Resistance sets/sec reps notes          Hand heel rock  1 10 Performed in seated position due to knee pain   Crossover piriformis stretch  30\" 3    Fig 4 piriformis stretch  30\" 3 Therapeutic Activities                                                               Manual Intervention 5'              Prone PA 5'   L3-5 CPA and R UPA mobs   GISTM/STM       Lumbar Manip       SI Manip       Hip belt mobs    NPV    Hip LA distraction              NMR re-education                                             Pt. Education:  -pt educated on diagnosis, prognosis and expectations for rehab  -all pt questions were answered    Home Exercise Program:  [employer]ria Fly access code [facility] [employer]    Therapeutic Exercise and NMR EXR  [x] (34972) Provided verbal/tactile cueing for activities related to strengthening, flexibility, endurance, ROM  for improvements in proximal hip and core control with self care, mobility, lifting and ambulation.  [] (01183) Provided verbal/tactile cueing for activities related to improving balance, coordination, kinesthetic sense, posture, motor skill, proprioception  to assist with core control in self care, mobility, lifting, and ambulation.      Therapeutic Activities:    [] (37561 or 08901) Provided verbal/tactile cueing for activities related to improving balance, coordination, kinesthetic sense, posture, motor skill, proprioception and motor activation to allow for proper function  with self care and ADLs  [] (25037) Provided training and instruction to the patient for proper core and proximal hip recruitment and positioning with ambulation re-education     Home Exercise Program:    [x] (50282) Reviewed/Progressed HEP activities related to strengthening, flexibility, endurance, ROM of core, proximal hip and LE for functional self-care, mobility, lifting and ambulation   [] (54965) Reviewed/Progressed HEP activities related to improving balance, coordination, kinesthetic sense, posture, motor skill, proprioception of core, proximal hip and LE for self care, mobility, lifting, and ambulation      Manual Treatments:  PROM / STM / Oscillations-Mobs:  G-I, II, III, IV (Maribell, Inf., Post.)  [x] (62494) Provided manual therapy to mobilize proximal hip and LS spine soft tissue/joints for the purpose of modulating pain, promoting relaxation,  increasing ROM, reducing/eliminating soft tissue swelling/inflammation/restriction, improving soft tissue extensibility and allowing for proper ROM for normal function with self care, mobility, lifting and ambulation. Modalities:       Charges:  Timed Code Treatment Minutes: 15   Total Treatment Minutes: 55       [] EVAL (LOW) 50129 (typically 20 minutes face-to-face)  [x] EVAL (MOD) 94724 (typically 30 minutes face-to-face)  [] EVAL (HIGH) 12592 (typically 45 minutes face-to-face)  [] RE-EVAL     [x] LR(11654) x     [] Pretty Speak (98671)  [] NMR (11595) x     [] VASO (76559)  [] Manual (63781) x     [] Other:  [] TA (58521)x     [] Mech Traction (43788)  [] ES(attended) (70378)     [] ES (un) (76352): If BWC Please Indicate Time In/Out and Total Minutes  CPT Code Time in Time out Total Min                                            Goals:   Patient stated goal: \"To reduce my pain and increase my range of motion and strength\"  [] Progressing: [] Met: [] Not Met: [] Adjusted     Therapist goals for Patient:   Short Term Goals: To be achieved in: 2 weeks  1. Independent in HEP and progression per patient tolerance, in order to prevent re-injury. [] Progressing: [] Met: [] Not Met: [] Adjusted  2. Patient will have a decrease in pain to facilitate improvement in movement, function, and ADLs as indicated by Functional Deficits. [] Progressing: [] Met: [] Not Met: [] Adjusted     Long Term Goals: To be achieved in: 6-8 weeks  1. Patient will reach SOLEDAD functional score of less than or equal to 20 to assist with reaching prior level of function with activities such as sitting and standing. [] Progressing: [] Met: [] Not Met: [] Adjusted  2.  Patient will demonstrate increased AROM to WNL, good LS mobility, good hip ROM to allow for proper joint functioning as indicated by patients Functional Deficits.   [] Progressing: [] Met: [] Not Met: [] Adjusted  3. Patient will demonstrate an increase in Strength to good proximal hip and core activation to allow for proper functional mobility as indicated by patients Functional Deficits.   [] Progressing: [] Met: [] Not Met: [] Adjusted  4. Patient will return to sitting for 30 minutes without increased symptoms or restriction.   [] Progressing: [] Met: [] Not Met: [] Adjusted  5. Patient will be able to sleep for 1 night without disturbances due to increased symptoms or restriction.     [] Progressing: [] Met: [] Not Met: [] Adjusted           ASSESSMENT:  See eval    Treatment/Activity Tolerance:  [] Patient tolerated treatment well [] Patient limited by fatique  [x] Patient limited by pain  [] Patient limited by other medical complications  [] Other:     Overall Progression Towards Functional goals/ Treatment Progress Update:  [] Patient is progressing as expected towards functional goals listed.    [] Progression is slowed due to complexities/Impairments listed.  [] Progression has been slowed due to co-morbidities.  [x] Plan just implemented, too soon to assess goals progression <30days   [] Goals require adjustment due to lack of progress  [] Patient is not progressing as expected and requires additional follow up with physician  [] Other:    Prognosis for POC: [x] Good [] Fair  [] Poor    Patient requires continued skilled intervention: [x] Yes  [] No        PLAN: See eval  [] Continue per plan of care [] Alter current plan (see comments)  [x] Plan of care initiated [] Hold pending MD visit [] Discharge    Electronically signed by: Kathryn Pace, PT    Note: If patient does not return for scheduled/recommended follow up visits, this note will serve as a discharge from care along with the most recent update on progress.

## 2023-01-18 NOTE — PLAN OF CARE
[facility], [facility]  [address]    [place] [address]  Phone [phone]    Fax [phone]                                                       Physical Therapy Certification    Dear Ronen Brown*  ,    We had the pleasure of evaluating the following patient for physical therapy services at [facility].  A summary of our findings can be found in the initial assessment below.  This includes our plan of care.  If you have any questions or concerns regarding these findings, please do not hesitate to contact me at the office phone number checked above.  Thank you for the referral.       Physician Signature:_______________________________Date:__________________  By signing above (or electronic signature), therapist’s plan is approved by physician      Patient: Hilda Gloria   : 1965   MRN: [id number]  Referring Physician: Ronen Brown*        Evaluation Date: 2023      Medical Diagnosis:  Hx of prothrombin mutation [Z86.2]  History of pulmonary embolism [Z86.711]  Herniation of intervertebral disc between L5 and S1 [M51.27]  Radicular pain of right lower extremity [M54.10]  DDD (degenerative disc disease), lumbar [M51.36]  Lumbar spondylosis [M47.816]  Treatment Diagnosis:    ICD-10-CM    1. Chronic right-sided low back pain, unspecified whether sciatica present  M54.50     G89.29                                           Insurance information: PT Insurance Information: [employer] - $1250 deductible, $3000 OOP max, $0 co-pay, 90%/10% co-insurance, DN not covered, auth required after 5 visits     Precautions/ Contra-indications: Prothrombin mutation (blood clotting disorder)    C-SSRS Triggered by Intake questionnaire (Past 2 wk assessment):   [x] No, Questionnaire did not trigger screening.   [] Yes, Patient intake triggered further evaluation      [] C-SSRS Screening completed  [] PCP  notified via Plan of Care  [] Emergency services notified     Latex Allergy:  [x]NO      []YES  Preferred Language for Healthcare:   [x]English       []Other:      SUBJECTIVE: Patient stated complaint: Pt reports hx of significant fall when she was 22 y.o. while cheerleading, landed on her tailbone from high up in the air. Pt reports this pain never has completely gone away. In May 2022, pt slipped on her sister's deck, injured her R shoulder and landed on her L hip/thigh. Currently, pain is worst in R side of lower back. Pain is constant dull ache with occasional sharp pains. Pt also reports that she occasionally has pain that feels like it radiates through her R hip to anterior aspect of hip. LBP is worst with prolonged sitting and with prolonged standing. Pt reports pain typically hits her later in the day when she is sleeping, but is worst after days where she has stood or sat for too long. 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 Pt reports having long hx of N/T in R thigh, which was diagnosed as meralgia paresthetica. 3 days ago, pt was helping her son clean out his garage (lifting, climbing up and down etc) and has had significant pain that feels like muscle cramps down her R leg into her foot. Pt started having L groin and thigh pain with unexplained bruising in ~3 places about 1 week ago. Pt went to ED, had doppler done which was (-) for DVT, d-dimer test showed elevated levels. Pt also reports hx of R plantar fasciitis, got custom orthotics which Dec which have helped some. MRI of lumbar spine showed multi-level compressive discopathy with moderate facet arthropathy. Pt has hx of multiple DVT's and PE's. MD prescribed pt muscle relaxers, pt stopped taking due to crazy dreams.      Relevant Medical History: Clotting mutation, hysterectomy 2019, hx of R plantar fasciitis   Functional Disability Index: SOLEDAD 32% dysfunction     Pain Scale: 2-3/10  Easing factors: movement, activity  Provocative factors: prolonged sitting, prolonged standing, sleeping     Type: [x]Constant   []Intermittent  []Radiating [x]Localized []other:     Numbness/Tingling: Pt reports having N/T in R thigh that has been present for years    Occupation/School:  Pt teaches collegiate level anatomy and physiology    Living Status/Prior Level of Function: Independent with ADLs and IADLs.      OBJECTIVE:     ROM     Lumbar Flexion WNL \"pn relief\"    Lumbar Extension Mod restrictions *pn     LEFT RIGHT   Lumbar sidebend 50% \"tight\" 25% *R sided pn   Lumbar Quadrant Mild + +   Thoracic Rotation      LEFT RIGHT   HIP Flex WNL Dec \"feels good\"   HIP Abd     HIP ER WNL Dec   HIP IR WNL Dec *LBP   Knee Flex     Knee ext     Hamstring length Mild limitations Mild limitations   Piriformis length Mild limitations Mod limitations   Sachin Test          Strength  LEFT RIGHT   ALL NORMAL []      MfA     TrA     HIP Flexors (L1-2) 4 4-   HIP Abductors     HIP extension     Knee EXT (L3) 4+ 4   Knee Flex (S1) 4+ 4 *LBP   Ankle DF (L4) 4+ 4+   Ankle PF (S2) 4+ 4+   Great Toe Ext (L5)         Reflexes  Normal Abnormal Comments   ALL NORMAL []       S1-2 Seated achilles [] []    S1-2 Prone knee bend [] []    L3-4 Patellar tendon [] []    C5-6 Biceps [] []    C6 Brachioradialis [] []    C7-8 Triceps [] []      Sensation:    [x]Dermatomes:   [] Normal   [x] Abnormal - decreased sensation along R LE compared to L LE     Joint mobility: Lumbar spine CPA and R UPA mobs   []Normal    [x]Hypo    []Hyper    Palpation: TTP L4 and L5 TPs    Functional Mobility/Transfers: Pt has difficulty with bed mobility and sit to stands due to LBP    Posture: Unremarkable    Bandages/Dressings/Incisions: N/A    Gait: (include devices/WB status) Increased R toe out, decreased trunk rotation      Neural dynamic tension testing Normal Abnormal Comments   Slump Test X     SLR  X     Femoral nerve (L2-4)        Orthopedic Special Tests:    Normal Abnormal N/A Comments   Toe walk         Heel Walk       Fwd Bend-aberrant or innominate mvmt) X      Trendelenburg X      Kemps/Quadrant  X  R   Kokok       WYATT/Dipesh X      Hip scour       ASLR       Crossed SLR       Supine to sit       Thigh thrust       SI distraction/compression       PA/Spring  X     Prone Instability test       Prone knee bend       Sacral Spring/thrust                                     [x] Patient history, allergies, meds reviewed. Medical chart reviewed. See intake form. Review Of Systems (ROS):  [x]Performed Review of systems (Integumentary, CardioPulmonary, Neurological) by intake and observation. Intake form has been scanned into medical record. Patient has been instructed to contact their primary care physician regarding ROS issues if not already being addressed at this time.       Co-morbidities/Complexities (which will affect course of rehabilitation):   []None           Arthritic conditions   []Rheumatoid arthritis (M05.9)  []Osteoarthritis (M19.91)   Cardiovascular conditions   []Hypertension (I10)  []Hyperlipidemia (E78.5)  []Angina pectoris (I20)  []Atherosclerosis (I70)   Musculoskeletal conditions   []Disc pathology   []Congenital spine pathologies   []Prior surgical intervention  []Osteoporosis (M81.8)  []Osteopenia (M85.8)   Endocrine conditions   []Hypothyroid (E03.9)  []Hyperthyroid Gastrointestinal conditions   []Constipation (F82.57)   Metabolic conditions   []Morbid obesity (E66.01)  []Diabetes type 1(E10.65) or 2 (E11.65)   []Neuropathy (G60.9)     Pulmonary conditions   []Asthma (J45)  []Coughing   []COPD (J44.9)   Psychological Disorders  []Anxiety (F41.9)  []Depression (F32.9)   []Other:   [x]Other:   prothrombin mutation, hx of multiple DVT's and PE's       Barriers to/and or personal factors that will affect rehab potential:              [x]Age  []Sex              []Motivation/Lack of Motivation                        [x]Co-Morbidities              []Cognitive Function, education/learning barriers              []Environmental, home barriers              [x]profession/work barriers  [x]past PT/medical experience  []other:  Justification:     Falls Risk Assessment (30 days):   [x] Falls Risk assessed and no intervention required. [] Falls Risk assessed and Patient requires intervention due to being higher risk   TUG score (>12s at risk):     [] Falls education provided, including         ASSESSMENT:   Functional Impairments:     [x]Noted lumbar/proximal hip hypomobility   []Noted lumbosacral and/or generalized hypermobility   [x]Decreased Lumbosacral/hip/LE functional ROM   [x]Decreased core/proximal hip strength and neuromuscular control    [x]Decreased LE functional strength    [x]Abnormal reflexes/sensation/myotomal/dermatomal deficits  []Reduced balance/proprioceptive control    []other:      Functional Activity Limitations (from functional questionnaire and intake)   [x]Reduced ability to tolerate prolonged functional positions   [x]Reduced ability or difficulty with changes of positions or transfers between positions   [x]Reduced ability to maintain good posture and demonstrate good body mechanics with sitting, bending, and lifting   [x]Reduced ability to sleep   [x] Reduced ability or tolerance with driving and/or computer work   [x]Reduced ability to perform lifting, reaching, carrying tasks   [x]Reduced ability to squat   []Reduced ability to forward bend   [x]Reduced ability to ambulate prolonged functional periods/distances/surfaces   [x]Reduced ability to ascend/descend stairs   []other:       Participation Restrictions   []Reduced participation in self care activities   [x]Reduced participation in home management activities   [x]Reduced participation in work activities   [x]Reduced participation in social activities. []Reduced participation in sport/recreation activities.     Classification:   [x]Signs/symptoms consistent with Lumbar instability/stabilization subgroup. [x]Signs/symptoms consistent with Lumbar mobilization/manipulation subgroup, myotomes and dermatomes intact. Meets manipulation criteria. []Signs/symptoms consistent with Lumbar direction specific/centralization subgroup   []Signs/symptoms consistent with Lumbar traction subgroup     []Signs/symptoms consistent with lumbar facet dysfunction   []Signs/symptoms consistent with lumbar stenosis type dysfunction   []Signs/symptoms consistent with nerve root involvement including myotome & dermatome dysfunction   []Signs/symptoms consistent with post-surgical status including: decreased ROM, strength and function. []signs/symptoms consistent with pathology which may benefit from Dry needling     []other:      Prognosis/Rehab Potential:      []Excellent   [x]Good    []Fair   []Poor    Tolerance of evaluation/treatment:    []Excellent   [x]Good    []Fair   []Poor     Physical Therapy Evaluation Complexity Justification  [x] A history of present problem with:  [] no personal factors and/or comorbidities that impact the plan of care;  [x]1-2 personal factors and/or comorbidities that impact the plan of care  []3 personal factors and/or comorbidities that impact the plan of care  [x] An examination of body systems using standardized tests and measures addressing any of the following: body structures and functions (impairments), activity limitations, and/or participation restrictions;:  [] a total of 1-2 or more elements   [] a total of 3 or more elements   [x] a total of 4 or more elements   [x] A clinical presentation with:  [] stable and/or uncomplicated characteristics   [x] evolving clinical presentation with changing characteristics  [] unstable and unpredictable characteristics;   [x] Clinical decision making of [] low, [x] moderate, [] high complexity using standardized patient assessment instrument and/or measurable assessment of functional outcome.     [] EVAL (LOW) 88911 (typically 30 minutes face-to-face)  [x] EVAL (MOD) 71312 (typically 30 minutes face-to-face)  [] EVAL (HIGH) 39490 (typically 45 minutes face-to-face)  [] RE-EVAL     PLAN: Begin PT focusing on: proximal hip mobilizations, LB mobs, LB core activation, proximal hip activation, and HEP    Frequency/Duration:  1-2 days per week for 6-8 Weeks:  Interventions:  [x]  Therapeutic exercise including: strength training, ROM, for LE, Glutes and core   [x]  NMR activation and proprioception for glutes , LE and Core   [x]  Manual therapy as indicated for Hip complex, LE and spine to include: Dry Needling/IASTM, STM, PROM, Gr I-IV mobilizations, manipulation. [x]  Modalities as needed that may include: thermal agents, E-stim, Biofeedback, US, iontophoresis as indicated  [x]  Patient education on joint protection, postural re-education, activity modification, progression of HEP. HEP instruction: Written HEP instructions provided and reviewed. GOALS:  Patient stated goal: \"To reduce my pain and increase my range of motion and strength\"  [] Progressing: [] Met: [] Not Met: [] Adjusted    Therapist goals for Patient:   Short Term Goals: To be achieved in: 2 weeks  1. Independent in HEP and progression per patient tolerance, in order to prevent re-injury. [] Progressing: [] Met: [] Not Met: [] Adjusted  2. Patient will have a decrease in pain to facilitate improvement in movement, function, and ADLs as indicated by Functional Deficits. [] Progressing: [] Met: [] Not Met: [] Adjusted    Long Term Goals: To be achieved in: 6-8 weeks  1. Patient will reach SOLEDAD functional score of less than or equal to 20 to assist with reaching prior level of function with activities such as sitting and standing. [] Progressing: [] Met: [] Not Met: [] Adjusted  2. Patient will demonstrate increased AROM to WNL, good LS mobility, good hip ROM to allow for proper joint functioning as indicated by patients Functional Deficits.    [] Progressing: [] Met: [] Not Met: [] Adjusted  3. Patient will demonstrate an increase in Strength to good proximal hip and core activation to allow for proper functional mobility as indicated by patients Functional Deficits. [] Progressing: [] Met: [] Not Met: [] Adjusted  4. Patient will return to sitting for 30 minutes without increased symptoms or restriction. [] Progressing: [] Met: [] Not Met: [] Adjusted  5. Patient will be able to sleep for 1 night without disturbances due to increased symptoms or restriction.      [] Progressing: [] Met: [] Not Met: [] Adjusted     Electronically signed by:  Julia Pollack PT

## 2023-01-25 ENCOUNTER — HOSPITAL ENCOUNTER (OUTPATIENT)
Dept: PHYSICAL THERAPY | Age: 58
Setting detail: THERAPIES SERIES
Discharge: HOME | End: 2023-01-25
Payer: COMMERCIAL

## 2023-01-25 PROCEDURE — 97140 MANUAL THERAPY 1/> REGIONS: CPT

## 2023-01-25 PROCEDURE — 97110 THERAPEUTIC EXERCISES: CPT

## 2023-01-25 NOTE — FLOWSHEET NOTE
[facility] [facility] [employer]    Physical Therapy Treatment Note/ Progress Report:     Date:  2023    Patient Name:  Carlos Erwin    :  1965  MRN: [id number]  Medical Diagnosis:  Hx of prothrombin mutation [Z86.2]  History of pulmonary embolism [Z86.711]  Herniation of intervertebral disc between L5 and S1 [M51.27]  Radicular pain of right lower extremity [M54.10]  DDD (degenerative disc disease), lumbar [M51.36]  Lumbar spondylosis [M47.816]  Treatment Diagnosis:    ICD-10-CM    1. Chronic right-sided low back pain, unspecified whether sciatica present  M54.50     G89.29         Insurance/Certification information:  PT Insurance Information: [employer] - $1250 deductible, $3000 OOP max, $0 co-pay, 90%/10% co-insurance, DN not covered, auth required after 5 visits  Physician Information:  Lauri Chambers[address] signed (Y/N): []  Yes [x]  No  Date sent:     Date of Patient follow up with Physician:      Progress Report: []  Yes  [x]  No     Functional Scale:    Date assessed:  SOLEDAD 32 % disability    23    Date Range for reporting period:  Beginnin23  Ending:      Progress report due (10 Rx/or 30 days whichever is less):      Recertification due (POC duration/ or 90 days whichever is less): 3/18/23     Visit # Insurance Allowable Auth required? Date Range   2 MN - auth required after 5 visits [x]  Yes  []  No      Pain level:  2-3/10     SUBJECTIVE:  Pt reports back has been bothering her more the last few days. Pt reports she has only had 2 good nights of sleep lately. Pt reports stretches/HEP seem to help when she wakes up at night though.      OBJECTIVE: See eval  Observation:   Test measurements:      RESTRICTIONS/PRECAUTIONS: clotting mutation, hx of hysterectomy, fall in May 2022, hx of R plantar fasciitis     Treatment based classification:     Exercises/Interventions:     Therapeutic Ex 25' Wt / Resistance sets/sec reps notes          Hand heel rock  1 10 Performed in seated position due to knee pain   Crossover piriformis stretch  30\" 3    Fig 4 piriformis stretch  30\" 3    LTR  10\" 5 To L   Bridges w/BS  1 15    Lateral bandwalking Teal  1 2-3 laps Band around knees   Palloff press blue 3\" 15 B                              Therapeutic Activities                                                               Manual Intervention 20'              Prone PA 10'   L3-5 CPA and R UPA mobs   GISTM/STM       Lumbar Mobs in S/L 10'   R   SI Manip       Hip belt mobs    NPV? Hip LA distraction              NMR re-education                                             Pt. Education:  -pt educated on diagnosis, prognosis and expectations for rehab  -all pt questions were answered    Home Exercise Program:  Alex Soto access code [facility] [employer]    Therapeutic Exercise and NMR EXR  [x] (51290) Provided verbal/tactile cueing for activities related to strengthening, flexibility, endurance, ROM  for improvements in proximal hip and core control with self care, mobility, lifting and ambulation.  [] (31286) Provided verbal/tactile cueing for activities related to improving balance, coordination, kinesthetic sense, posture, motor skill, proprioception  to assist with core control in self care, mobility, lifting, and ambulation.      Therapeutic Activities:    [] (62227 or 00785) Provided verbal/tactile cueing for activities related to improving balance, coordination, kinesthetic sense, posture, motor skill, proprioception and motor activation to allow for proper function  with self care and ADLs  [] (87313) Provided training and instruction to the patient for proper core and proximal hip recruitment and positioning with ambulation re-education     Home Exercise Program:    [x] (18897) Reviewed/Progressed HEP activities related to strengthening, flexibility, endurance, ROM of core, proximal hip and LE for functional self-care, mobility, lifting and ambulation [] (17534) Reviewed/Progressed HEP activities related to improving balance, coordination, kinesthetic sense, posture, motor skill, proprioception of core, proximal hip and LE for self care, mobility, lifting, and ambulation      Manual Treatments:  PROM / STM / Oscillations-Mobs:  G-I, II, III, IV (PA's, Inf., Post.)  [x] (18470) Provided manual therapy to mobilize proximal hip and LS spine soft tissue/joints for the purpose of modulating pain, promoting relaxation,  increasing ROM, reducing/eliminating soft tissue swelling/inflammation/restriction, improving soft tissue extensibility and allowing for proper ROM for normal function with self care, mobility, lifting and ambulation. Modalities:       Charges:  Timed Code Treatment Minutes: 45   Total Treatment Minutes: 45       [] EVAL (LOW) 87658 (typically 20 minutes face-to-face)  [] EVAL (MOD) 15437 (typically 30 minutes face-to-face)  [] EVAL (HIGH) 16303 (typically 45 minutes face-to-face)  [] RE-EVAL     [x] DL(57522) x  2   [] IONTO (23724)  [] NMR (69884) x     [] VASO (36797)  [x] Manual (82365) x 1    [] Other:  [] TA (14843)x     [] Mech Traction (03756)  [] ES(attended) (23180)     [] ES (un) (10678): If BWC Please Indicate Time In/Out and Total Minutes  CPT Code Time in Time out Total Min                                            Goals:   Patient stated goal: \"To reduce my pain and increase my range of motion and strength\"  [] Progressing: [] Met: [] Not Met: [] Adjusted     Therapist goals for Patient:   Short Term Goals: To be achieved in: 2 weeks  1. Independent in HEP and progression per patient tolerance, in order to prevent re-injury. [] Progressing: [] Met: [] Not Met: [] Adjusted  2. Patient will have a decrease in pain to facilitate improvement in movement, function, and ADLs as indicated by Functional Deficits. [] Progressing: [] Met: [] Not Met: [] Adjusted     Long Term Goals: To be achieved in: 6-8 weeks  1.  Patient will reach SOLEDAD functional score of less than or equal to 20 to assist with reaching prior level of function with activities such as sitting and standing. [] Progressing: [] Met: [] Not Met: [] Adjusted  2. Patient will demonstrate increased AROM to WNL, good LS mobility, good hip ROM to allow for proper joint functioning as indicated by patients Functional Deficits. [] Progressing: [] Met: [] Not Met: [] Adjusted  3. Patient will demonstrate an increase in Strength to good proximal hip and core activation to allow for proper functional mobility as indicated by patients Functional Deficits. [] Progressing: [] Met: [] Not Met: [] Adjusted  4. Patient will return to sitting for 30 minutes without increased symptoms or restriction. [] Progressing: [] Met: [] Not Met: [] Adjusted  5. Patient will be able to sleep for 1 night without disturbances due to increased symptoms or restriction. [] Progressing: [] Met: [] Not Met: [] Adjusted           ASSESSMENT:  Pt responded well to manual therapy, especially S/L lumbar mobs in mid range with decreased reports of pain and improved lumbar AROM noted afterwards. Progressed core and glut strengthening with fatigue noted and cues required initially for appropriate form with exercises. Treatment/Activity Tolerance:  [] Patient tolerated treatment well [] Patient limited by fatique  [x] Patient limited by pain  [] Patient limited by other medical complications  [] Other:     Overall Progression Towards Functional goals/ Treatment Progress Update:  [] Patient is progressing as expected towards functional goals listed. [] Progression is slowed due to complexities/Impairments listed. [] Progression has been slowed due to co-morbidities.   [x] Plan just implemented, too soon to assess goals progression <30days   [] Goals require adjustment due to lack of progress  [] Patient is not progressing as expected and requires additional follow up with physician  [] Other:    Prognosis for POC: [x] Good [] Fair  [] Poor    Patient requires continued skilled intervention: [x] Yes  [] No        PLAN: Decrease pain, improve mobility and stability. [x] Continue per plan of care [] Alter current plan (see comments)  [] Plan of care initiated [] Hold pending MD visit [] Discharge    Electronically signed by: Avel Mcdonald PT    Note: If patient does not return for scheduled/recommended follow up visits, this note will serve as a discharge from care along with the most recent update on progress.

## 2023-02-01 ENCOUNTER — HOSPITAL ENCOUNTER (OUTPATIENT)
Dept: PHYSICAL THERAPY | Age: 58
Setting detail: THERAPIES SERIES
Discharge: HOME | End: 2023-02-01
Payer: COMMERCIAL

## 2023-02-01 PROCEDURE — 97140 MANUAL THERAPY 1/> REGIONS: CPT

## 2023-02-01 PROCEDURE — 97110 THERAPEUTIC EXERCISES: CPT

## 2023-02-01 PROCEDURE — 97112 NEUROMUSCULAR REEDUCATION: CPT

## 2023-02-01 NOTE — FLOWSHEET NOTE
[facility]    Physical Therapy Treatment Note/ Progress Report:     Date:  2023    Patient Name:  Kaitlynn Alfonso    :  1965  MRN: [id number]  Medical Diagnosis:  Hx of prothrombin mutation [Z86.2]  History of pulmonary embolism [Z86.711]  Herniation of intervertebral disc between L5 and S1 [M51.27]  Radicular pain of right lower extremity [M54.10]  DDD (degenerative disc disease), lumbar [M51.36]  Lumbar spondylosis [M47.816]  Treatment Diagnosis:    ICD-10-CM    1. Chronic right-sided low back pain, unspecified whether sciatica present  M54.50     G89.29         Insurance/Certification information:  PT Insurance Information: [employer] - $1250 deductible, $3000 OOP max, $0 co-pay, 90%/10% co-insurance, DN not covered, auth required after 5 visits  Physician Information:  Cesar Garcia [address] [place] [address] signed (Y/N): []  Yes [x]  No  Date sent:     Date of Patient follow up with Physician:      Progress Report: []  Yes  [x]  No     Functional Scale:    Date assessed:  SOLEDAD 32 % disability    23    Date Range for reporting period:  Beginnin23  Ending:      Progress report due (10 Rx/or 30 days whichever is less):      Recertification due (POC duration/ or 90 days whichever is less): 3/18/23     Visit # Insurance Allowable Auth required? Date Range   3 [place] - auth required after 5 visits [x]  Yes  []  No      Pain level:  2-3/10     SUBJECTIVE:  Pt reports that her back has been feeling better overall, less tightness and stiffness. Pt reports that her R heel has been more painful than her back lately.       OBJECTIVE: See eval  Observation:   Test measurements:      RESTRICTIONS/PRECAUTIONS: clotting mutation, hx of hysterectomy, fall in May 2022, hx of R plantar fasciitis     Treatment based classification:     Exercises/Interventions:     Therapeutic Ex 14' Wt / Resistance sets/sec reps notes          Hand heel rock  1 10 Performed in seated position due to knee pain   Crossover piriformis stretch  30\" 3    Fig 4 piriformis stretch  30\" 3    LTR  10\" 5 To L   Bridges w/BS  1 15    Lateral bandwalking Teal  1 2-3 laps Band around knees   Palloff press blue 3\" 15 B                              Therapeutic Activities                                                               Manual Intervention 26'              Prone PA 10'   L3-5 CPA and R UPA mobs   GISTM/STM       Lumbar Mobs in S/L 6'   R   SI Manip       Hip belt mobs    NPV? STM 5'   R gastroc   TC joint mobs  Mid-foot mobs  Subtalar mobs 5'   R   NMR re-education 8'              Prone hip extension  1 10x  Table flexed   Quadruped alternating LE  1 10x ea With red therabar for cues                    Pt. Education:  -pt educated on diagnosis, prognosis and expectations for rehab  -all pt questions were answered    Home Exercise Program:  Josephina Councilman access code [facility]    Therapeutic Exercise and NMR EXR  [x] (65665) Provided verbal/tactile cueing for activities related to strengthening, flexibility, endurance, ROM  for improvements in proximal hip and core control with self care, mobility, lifting and ambulation.  [] (68886) Provided verbal/tactile cueing for activities related to improving balance, coordination, kinesthetic sense, posture, motor skill, proprioception  to assist with core control in self care, mobility, lifting, and ambulation.      Therapeutic Activities:    [] (61251 or 65909) Provided verbal/tactile cueing for activities related to improving balance, coordination, kinesthetic sense, posture, motor skill, proprioception and motor activation to allow for proper function  with self care and ADLs  [] (00500) Provided training and instruction to the patient for proper core and proximal hip recruitment and positioning with ambulation re-education     Home Exercise Program:    [x] (57529) Reviewed/Progressed HEP activities related to strengthening, flexibility, endurance, ROM of core, proximal hip and LE for functional self-care, mobility, lifting and ambulation   [] (14533) Reviewed/Progressed HEP activities related to improving balance, coordination, kinesthetic sense, posture, motor skill, proprioception of core, proximal hip and LE for self care, mobility, lifting, and ambulation      Manual Treatments:  PROM / STM / Oscillations-Mobs:  G-I, II, III, IV (PA's, Inf., Post.)  [x] (88211) Provided manual therapy to mobilize proximal hip and LS spine soft tissue/joints for the purpose of modulating pain, promoting relaxation,  increasing ROM, reducing/eliminating soft tissue swelling/inflammation/restriction, improving soft tissue extensibility and allowing for proper ROM for normal function with self care, mobility, lifting and ambulation. Modalities:       Charges:  Timed Code Treatment Minutes: 48   Total Treatment Minutes: 48       [] EVAL (LOW) 16068 (typically 20 minutes face-to-face)  [] EVAL (MOD) 20368 (typically 30 minutes face-to-face)  [] EVAL (HIGH) 37940 (typically 45 minutes face-to-face)  [] RE-EVAL     [x] ZJ(02818) x  1   [] IONTO (27105)  [x] NMR (57877) x 1    [] VASO (30733)  [x] Manual (05667) x 1    [] Other:  [] TA (52667)x     [] Mech Traction (64777)  [] ES(attended) (02868)     [] ES (un) (86758): If BWC Please Indicate Time In/Out and Total Minutes  CPT Code Time in Time out Total Min                                            Goals:   Patient stated goal: \"To reduce my pain and increase my range of motion and strength\"  [] Progressing: [] Met: [] Not Met: [] Adjusted     Therapist goals for Patient:   Short Term Goals: To be achieved in: 2 weeks  1. Independent in HEP and progression per patient tolerance, in order to prevent re-injury. [] Progressing: [] Met: [] Not Met: [] Adjusted  2. Patient will have a decrease in pain to facilitate improvement in movement, function, and ADLs as indicated by Functional Deficits.   [] Progressing: [] Met: [] Not Met: [] Adjusted     Long Term Goals: To be achieved in: 6-8 weeks  1. Patient will reach SOLEDAD functional score of less than or equal to 20 to assist with reaching prior level of function with activities such as sitting and standing.  [] Progressing: [] Met: [] Not Met: [] Adjusted  2. Patient will demonstrate increased AROM to WNL, good LS mobility, good hip ROM to allow for proper joint functioning as indicated by patients Functional Deficits.   [] Progressing: [] Met: [] Not Met: [] Adjusted  3. Patient will demonstrate an increase in Strength to good proximal hip and core activation to allow for proper functional mobility as indicated by patients Functional Deficits.   [] Progressing: [] Met: [] Not Met: [] Adjusted  4. Patient will return to sitting for 30 minutes without increased symptoms or restriction.   [] Progressing: [] Met: [] Not Met: [] Adjusted  5. Patient will be able to sleep for 1 night without disturbances due to increased symptoms or restriction.     [] Progressing: [] Met: [] Not Met: [] Adjusted           ASSESSMENT:  Pt continues to respond well to manual therapy with improved ability noted afterwards. Progressed core and glut strengthening today with fatigue noted and cues required to maintain neutral spine with quadruped alternating LE. Pt would continue to respond well to skilled PT services to address lumbar hypomobility and core/glut strength in order to return to PLOF.     Treatment/Activity Tolerance:  [] Patient tolerated treatment well [] Patient limited by fatique  [x] Patient limited by pain  [] Patient limited by other medical complications  [] Other:     Overall Progression Towards Functional goals/ Treatment Progress Update:  [] Patient is progressing as expected towards functional goals listed.    [] Progression is slowed due to complexities/Impairments listed.  [] Progression has been slowed due to co-morbidities.  [x] Plan just implemented, too soon  to assess goals progression <30days   [] Goals require adjustment due to lack of progress  [] Patient is not progressing as expected and requires additional follow up with physician  [] Other:    Prognosis for POC: [x] Good [] Fair  [] Poor    Patient requires continued skilled intervention: [x] Yes  [] No        PLAN: Decrease pain, improve mobility and stability. [x] Continue per plan of care [] Alter current plan (see comments)  [] Plan of care initiated [] Hold pending MD visit [] Discharge    Electronically signed by: Monty Leiva PT    Note: If patient does not return for scheduled/recommended follow up visits, this note will serve as a discharge from care along with the most recent update on progress.

## 2023-02-02 ENCOUNTER — OFFICE VISIT (OUTPATIENT)
Dept: ENT CLINIC | Age: 58
End: 2023-02-02
Payer: COMMERCIAL

## 2023-02-02 ENCOUNTER — TELEPHONE (OUTPATIENT)
Dept: ENT CLINIC | Age: 58
End: 2023-02-02

## 2023-02-02 VITALS
DIASTOLIC BLOOD PRESSURE: 71 MMHG | HEART RATE: 74 BPM | TEMPERATURE: 96.7 F | WEIGHT: 182 LBS | SYSTOLIC BLOOD PRESSURE: 116 MMHG | BODY MASS INDEX: 27.58 KG/M2 | HEIGHT: 68 IN

## 2023-02-02 DIAGNOSIS — H90.3 SENSORINEURAL HEARING LOSS (SNHL) OF BOTH EARS: Primary | ICD-10-CM

## 2023-02-02 DIAGNOSIS — E04.9 GOITER: ICD-10-CM

## 2023-02-02 DIAGNOSIS — J34.2 DNS (DEVIATED NASAL SEPTUM): ICD-10-CM

## 2023-02-02 DIAGNOSIS — H69.83 DYSFUNCTION OF BOTH EUSTACHIAN TUBES: ICD-10-CM

## 2023-02-02 DIAGNOSIS — H60.8X3 CHRONIC ECZEMATOUS OTITIS EXTERNA OF BOTH EARS: ICD-10-CM

## 2023-02-02 DIAGNOSIS — J30.9 ALLERGIC RHINITIS, UNSPECIFIED SEASONALITY, UNSPECIFIED TRIGGER: ICD-10-CM

## 2023-02-02 DIAGNOSIS — H91.8X9 ASYMMETRICAL HEARING LOSS: ICD-10-CM

## 2023-02-02 DIAGNOSIS — H92.03 OTALGIA OF BOTH EARS: ICD-10-CM

## 2023-02-02 DIAGNOSIS — M26.609 TMJ DYSFUNCTION: ICD-10-CM

## 2023-02-02 DIAGNOSIS — J34.3 NASAL TURBINATE HYPERTROPHY: ICD-10-CM

## 2023-02-02 PROCEDURE — 95024 IQ TESTS W/ALLERGENIC XTRCS: CPT | Performed by: OTOLARYNGOLOGY

## 2023-02-02 PROCEDURE — 99204 OFFICE O/P NEW MOD 45 MIN: CPT | Performed by: OTOLARYNGOLOGY

## 2023-02-02 PROCEDURE — 95028 IQ TSTS ALLERGY DELAYED RXN: CPT | Performed by: OTOLARYNGOLOGY

## 2023-02-02 PROCEDURE — 92504 EAR MICROSCOPY EXAMINATION: CPT | Performed by: OTOLARYNGOLOGY

## 2023-02-02 RX ORDER — AZELASTINE 1 MG/ML
1 SPRAY, METERED NASAL 2 TIMES DAILY
Qty: 30 ML | Refills: 6 | Status: SHIPPED | OUTPATIENT
Start: 2023-02-02

## 2023-02-02 RX ORDER — FLUOCINOLONE ACETONIDE 0.11 MG/ML
4 OIL AURICULAR (OTIC) 2 TIMES DAILY
Qty: 1 EACH | Refills: 2 | Status: SHIPPED | OUTPATIENT
Start: 2023-02-02 | End: 2023-02-09

## 2023-02-02 ASSESSMENT — ENCOUNTER SYMPTOMS
NAUSEA: 0
DIARRHEA: 0
PHOTOPHOBIA: 0
BLOOD IN STOOL: 0
EYE DISCHARGE: 0
SORE THROAT: 0
VOICE CHANGE: 0
STRIDOR: 0
FACIAL SWELLING: 0
TROUBLE SWALLOWING: 0
SINUS PAIN: 0
EYE ITCHING: 0
RHINORRHEA: 1
SINUS PRESSURE: 1
CHOKING: 0
SHORTNESS OF BREATH: 0
VOMITING: 0
WHEEZING: 0
BACK PAIN: 0
CONSTIPATION: 0
COUGH: 0
COLOR CHANGE: 0

## 2023-02-02 NOTE — PROGRESS NOTES
[facility]  [address], [address]  P: [phone]  F: [phone]       Patient     Jemma Mauricio  1965    ChiefComplaint     Chief Complaint   Patient presents with    New Patient     Patient is here today because both of her ears have been itching, popping, crackling and pressure        History of Present Illness     Jemma Mauricio is a pleasant 61 y.o. female who presents as a new patient for ear issues referred by our audiologist.  She has had itching in the ears, pressure, popping bilaterally, dizziness. Symptoms worse since having COVID in 2022. No otalgia, persistent tinnitus, history of occupational noise exposure, history of head trauma, ear surgery. She does have some clearish otorrhea in the mornings. She wakes up in the morning with significant ear pressure. There is a positive family history for hearing loss at early age. Audiogram performed in December reveals bilateral mild to moderate low-frequency sensorineural hearing loss returning to normal around 3000 Hz. There is a 10 DB asymmetry at few consecutive frequencies in the left ear. She has type a tympanograms bilaterally. Excellent word recognition scores. Currently not wearing hearing aids. She has additional complaints of allergic rhinitis symptoms resulting in sinonasal congestion, pressure, nasal obstruction. She takes Claritin as needed. She has significant sedation from most antihistamines. She does not use any nasal steroid sprays regularly. She has never tried azelastine. She does use nasal saline. She does have a history of TMJ dysfunction and is currently undergoing bite guard fitting. She has not been allergy tested recently. She was allergy tested as a child and underwent immunotherapy. She had springtime and summertime allergies at that time. Lastly, she does have a history of a goiter. Per the patient was biopsied and 2016 or so.   Has not followed up with recent ultrasound.     Past Medical History     Past Medical History:   Diagnosis Date    Hx of blood clots     PE x 2, last one jan 2018    Prothrombin gene mutation [facility])        Past Surgical History     Past Surgical History:   Procedure Laterality Date    ANKLE SURGERY      APPENDECTOMY      HYSTERECTOMY, TOTAL ABDOMINAL (CERVIX REMOVED) N/A 12/16/2019    OPEN REMOVAL OF PELVIC MASS, HYSTERECTOMY, RIGHT SALPINGO- OOPHORECTOMY, LEFT SALPINGECTOMY, APPENDECTOMY performed by Carlos Anand MD at [place] [address]         Family History     Family History   Problem Relation Age of Onset    High Blood Pressure Mother     Cancer Father     High Blood Pressure Sister     Diabetes Brother     Cancer Brother        Social History     Social History     Socioeconomic History    Marital status: Single     Spouse name: Not on file    Number of children: Not on file    Years of education: Not on file    Highest education level: Not on file   Occupational History    Not on file   Tobacco Use    Smoking status: Never    Smokeless tobacco: Never   Vaping Use    Vaping Use: Never used   Substance and Sexual Activity    Alcohol use: Yes     Comment: occassional    Drug use: Never    Sexual activity: Not Currently   Other Topics Concern    Not on file   Social History Narrative    Not on file     Social Determinants of Health     Financial Resource Strain: Low Risk     Difficulty of Paying Living Expenses: Not hard at all   Food Insecurity: No Food Insecurity    Worried About Running Out of Food in the Last Year: Never true    Ran Out of Food in the Last Year: Never true   Transportation Needs: Not on file   Physical Activity: Not on file   Stress: Not on file   Social Connections: Not on file   Intimate Partner Violence: Not on file   Housing Stability: Not on file       Allergies     Allergies   Allergen Reactions    Latex      gloves    Zithromax [Azithromycin] Other (See Comments)     Ayala-jie syndrome       Medications     Current Outpatient Medications   Medication Sig Dispense Refill    fluocinolone (DERMOTIC) 0.01 % OIL oil Place 4 drops in ear(s) 2 times daily for 7 days 1 each 2    azelastine (ASTELIN) 0.1 % nasal spray 1 spray by Nasal route 2 times daily Use in each nostril as directed 30 mL 6    tiZANidine (ZANAFLEX) 4 MG tablet Take 1 tablet by mouth 3 times daily as needed (Muscle tightness/spasm) 30 tablet 1    albuterol sulfate HFA (PROVENTIL;VENTOLIN;PROAIR) 108 (90 Base) MCG/ACT inhaler       aspirin 81 MG EC tablet Take 243 mg by mouth in the morning. Takes 3 tablets daily. meloxicam (MOBIC) 7.5 MG tablet Take 1 tablet by mouth in the morning. 30 tablet 0    metroNIDAZOLE (METROGEL) 0.75 % gel Apply topically 2 times daily. 45 g 0     No current facility-administered medications for this visit. Review of Systems     Review of Systems   Constitutional:  Negative for activity change, appetite change, chills, diaphoresis, fatigue, fever and unexpected weight change. HENT:  Positive for congestion, ear discharge, ear pain, hearing loss, postnasal drip, rhinorrhea, sinus pressure and sneezing. Negative for dental problem, drooling, facial swelling, mouth sores, nosebleeds, sinus pain, sore throat, tinnitus, trouble swallowing and voice change. Eyes:  Negative for photophobia, discharge, itching and visual disturbance. Respiratory:  Negative for cough, choking, shortness of breath, wheezing and stridor. Gastrointestinal:  Negative for blood in stool, constipation, diarrhea, nausea and vomiting. Endocrine: Negative for cold intolerance, heat intolerance, polyphagia and polyuria. Musculoskeletal:  Negative for back pain, gait problem, neck pain and neck stiffness. Skin:  Negative for color change, pallor, rash and wound.    Neurological:  Negative for dizziness, syncope, facial asymmetry, speech difficulty, light-headedness, numbness and headaches. Hematological:  Negative for adenopathy. Does not bruise/bleed easily. Psychiatric/Behavioral:  Negative for agitation, confusion and sleep disturbance. PhysicalExam     Vitals:    02/02/23 0907   BP: 116/71   Pulse: 74   Temp: (!) 96.7 °F (35.9 °C)       Physical Exam  Constitutional:       Appearance: She is well-developed. HENT:      Head: Normocephalic and atraumatic. Not macrocephalic and not microcephalic. No raccoon eyes, Iniguez's sign, abrasion, contusion, right periorbital erythema, left periorbital erythema or laceration. Hair is normal.      Jaw: No trismus. Right Ear: Hearing, tympanic membrane and external ear normal. No decreased hearing noted. No drainage, swelling or tenderness. No middle ear effusion. No mastoid tenderness. Tympanic membrane is not perforated, retracted or bulging. Tympanic membrane has normal mobility. Left Ear: Hearing, tympanic membrane and external ear normal. No decreased hearing noted. No drainage, swelling or tenderness. No middle ear effusion. No mastoid tenderness. Tympanic membrane is not perforated, retracted or bulging. Tympanic membrane has normal mobility. Ears:        Nose: Septal deviation present. No nasal deformity, laceration, mucosal edema or rhinorrhea. Right Turbinates: Enlarged. Left Turbinates: Enlarged. Right Sinus: No maxillary sinus tenderness or frontal sinus tenderness. Left Sinus: No maxillary sinus tenderness or frontal sinus tenderness. Mouth/Throat:      Mouth: Mucous membranes are not pale, not dry and not cyanotic. No lacerations or oral lesions. Dentition: Normal dentition. No dental caries or dental abscesses. Pharynx: Uvula midline. No oropharyngeal exudate, posterior oropharyngeal erythema or uvula swelling. Tonsils: No tonsillar abscesses. Eyes:      General: Lids are normal.         Right eye: No discharge. Left eye: No discharge.       Extraocular Movements:      Right eye: Normal extraocular motion and no nystagmus. Left eye: Normal extraocular motion and no nystagmus. Conjunctiva/sclera:      Right eye: No chemosis or exudate. Left eye: No chemosis or exudate. Neck:      Thyroid: No thyroid mass or thyromegaly. Vascular: Normal carotid pulses. Trachea: No tracheal tenderness or tracheal deviation. Cardiovascular:      Rate and Rhythm: Normal rate and regular rhythm. Pulmonary:      Effort: No tachypnea, bradypnea or respiratory distress. Breath sounds: No stridor. Musculoskeletal:      Right shoulder: Normal range of motion. Cervical back: Neck supple. Lymphadenopathy:      Head:      Right side of head: No submental, submandibular, tonsillar, preauricular, posterior auricular or occipital adenopathy. Left side of head: No submental, submandibular, tonsillar, preauricular, posterior auricular or occipital adenopathy. Cervical: No cervical adenopathy. Right cervical: No superficial, deep or posterior cervical adenopathy. Left cervical: No superficial, deep or posterior cervical adenopathy. Skin:     General: Skin is warm and dry. Findings: No bruising, erythema, laceration, lesion or rash. Neurological:      Mental Status: She is alert and oriented to person, place, and time. Cranial Nerves: No cranial nerve deficit. Psychiatric:         Speech: Speech normal.         Behavior: Behavior normal.         Procedure     Otomicroscopy    An operating microscope was utilized to visualize the external auditory canals using a 4mm speculum. The external auditory canals are clear. Eczematous changes noted the tympanic membrane is intact. Ossicles appear intact. No fluid visualized in the middle ear. Assessment and Plan     1. Sensorineural hearing loss (SNHL) of both ears  Audiogram findings reviewed with the patient.   She does have mild to moderate low-frequency to mid frequency sensorineural hearing loss returning to normal in the high frequencies. There is a 10 DB asymmetry at 3 consecutive frequencies and a 10 DB asymmetry of her pure-tone average. Discussed the concern of asymmetry as a potential indicator for vestibular schwannoma. She does have excellent word recognition scores bilaterally. I suspect this is more related to genetic inherited form of hearing loss. We will hold on MRI at this time. I do recommend a repeat hearing test sometime later this year. She is a good candidate for hearing amplification. Prior to clearing for hearing aids would need to obtain MRI. 2. Dysfunction of both eustachian tubes  Patient has normal tympanograms on her audiogram.  However, she does wake up with ear pressure symptoms. I am concerned that this may be more related to TMJ dysfunction as she does clench and grind her teeth significantly at nighttime. I recommend addition of azelastine nasal spray to treat allergic rhinitis symptoms and potential eustachian tube dysfunction symptoms. Additionally recommend over-the-counter brand-name Nasacort. Proper administration and risks of medications discussed with the patient. 3. TMJ dysfunction  Undergoing bite guard fitting    4. Otalgia of both ears  Patient has eczematous otitis externa of both ears likely causing the itching. Recommend trial of Derm otic solution. Additionally recommend hydrocortisone cream on the outer ear. - fluocinolone (DERMOTIC) 0.01 % OIL oil; Place 4 drops in ear(s) 2 times daily for 7 days  Dispense: 1 each; Refill: 2    5. Chronic eczematous otitis externa of both ears    - fluocinolone (DERMOTIC) 0.01 % OIL oil; Place 4 drops in ear(s) 2 times daily for 7 days  Dispense: 1 each; Refill: 2    6. Allergic rhinitis, unspecified seasonality, unspecified trigger  In addition to initiating azelastine and Nasacort, I recommend allergy testing given the severity of her symptoms.   - azelastine (ASTELIN) 0.1 % nasal spray; 1 spray by Nasal route 2 times daily Use in each nostril as directed  Dispense: 30 mL; Refill: 6  - MI IC TSTS W/ALLGIC XTRCS DLYD TYP RXN W/READING  - MI INTRACUTANEOUS TESTS W/ALLERGENIC EXTRACTS    7. DNS (deviated nasal septum)      8. Nasal turbinate hypertrophy      9. Asymmetrical hearing loss      10. Goiter  Will likely obtain ultrasound at follow-up. Return for allergy testing. [ ] Review/order radiology tests   [ ] Independent interpretation of diagnostic test by another provider  [ ] Discussed case with another provider  [ ] High risk of loss of major body function  [ ] Elective major surgery with risk factors    Portions of this note were dictated using Dragon.  There may be linguistic errors secondary to the use of this program.

## 2023-02-02 NOTE — PATIENT INSTRUCTIONS
Pretz nasal spray - use as needed for nasal dryness    Nasacort - 2 sprays each nostril daily    Hydrocortisone cream - apply to outer ear twice daily for itching

## 2023-02-08 ENCOUNTER — HOSPITAL ENCOUNTER (OUTPATIENT)
Dept: PHYSICAL THERAPY | Age: 58
Setting detail: THERAPIES SERIES
Discharge: HOME | End: 2023-02-08
Payer: COMMERCIAL

## 2023-02-08 NOTE — FLOWSHEET NOTE
[facility]    Physical Therapy  Cancellation/No-show Note  Patient Name:  Tamika Esquivel  :  1965   Date:  2023  Cancelled visits to date: 1  No-shows to date: 0    For today's appointment patient:  [x]  Cancelled  []  Rescheduled appointment  []  No-show     Reason given by patient:  []  Patient ill  []  Conflicting appointment  []  No transportation    []  Conflict with work  []  No reason given  [x]  Other:  daughter-in-law went into labor   Comments:      Phone call information:   []  Phone call made today to patient at _ time at number provided:      []  Patient answered, conversation as follows:    []  Patient did not answer, message left as follows:  []  Phone call not made today  [x]  Phone call not needed - pt contacted us to cancel and provided reason for cancellation.      Electronically signed by:  Liliam Martins, PT, DPT

## 2023-02-15 ENCOUNTER — HOSPITAL ENCOUNTER (OUTPATIENT)
Dept: PHYSICAL THERAPY | Age: 58
Setting detail: THERAPIES SERIES
Discharge: HOME | End: 2023-02-15
Payer: COMMERCIAL

## 2023-02-15 NOTE — FLOWSHEET NOTE
[facility]    Physical Therapy  Cancellation/No-show Note  Patient Name:  Jeannine Hernandez  :  1965   Date:  2/15/2023  Cancelled visits to date: 2  No-shows to date: 0    For today's appointment patient:  [x]  Cancelled  []  Rescheduled appointment  []  No-show     Reason given by patient:  [x]  Patient ill  []  Conflicting appointment  []  No transportation    []  Conflict with work  []  No reason given  []  Other:     Comments:      Phone call information:   []  Phone call made today to patient at _ time at number provided:      []  Patient answered, conversation as follows:    []  Patient did not answer, message left as follows:  []  Phone call not made today  [x]  Phone call not needed - pt contacted us to cancel and provided reason for cancellation.      Electronically signed by:  Karmen Blackman, PT, DPT

## 2023-02-22 ENCOUNTER — HOSPITAL ENCOUNTER (OUTPATIENT)
Dept: PHYSICAL THERAPY | Age: 58
Setting detail: THERAPIES SERIES
Discharge: HOME | End: 2023-02-22
Payer: COMMERCIAL

## 2023-02-22 PROCEDURE — 97140 MANUAL THERAPY 1/> REGIONS: CPT

## 2023-02-22 PROCEDURE — 97110 THERAPEUTIC EXERCISES: CPT

## 2023-02-22 NOTE — FLOWSHEET NOTE
[employer]    Physical Therapy Treatment Note/ Progress Report:     Date:  2023    Patient Name:  Christiane Reich    :  1965  MRN: [id number]  Medical Diagnosis:  Hx of prothrombin mutation [Z86.2]  History of pulmonary embolism [Z86.711]  Herniation of intervertebral disc between L5 and S1 [M51.27]  Radicular pain of right lower extremity [M54.10]  DDD (degenerative disc disease), lumbar [M51.36]  Lumbar spondylosis [M47.816]  Treatment Diagnosis:    ICD-10-CM    1. Chronic right-sided low back pain, unspecified whether sciatica present  M54.50     G89.29         Insurance/Certification information:  PT Insurance Information: [employer] - $1250 deductible, $3000 OOP max, $0 co-pay, 90%/10% co-insurance, DN not covered, auth required after 5 visits  Physician Information:  Bart Grewal[address] signed (Y/N): []  Yes [x]  No  Date sent:     Date of Patient follow up with Physician:      Progress Report: []  Yes  [x]  No     Functional Scale:    Date assessed:  SOLEDAD 32 % disability    23    Date Range for reporting period:  Beginnin23  Ending:      Progress report due (10 Rx/or 30 days whichever is less):      Recertification due (POC duration/ or 90 days whichever is less): 3/18/23     Visit # Insurance Allowable Auth required? Date Range   4 [place] - auth required after 5 visits [x]  Yes  []  No      Pain level:  2-3/10     SUBJECTIVE:  Pt reports that tightness in lower back has felt better, but legs still feel very weak. Crossover piriformis stretch seems to help the most. R heel is still bothering, she switched to a new pair of shoes about 3 days ago.       OBJECTIVE: See eval  Observation:   Test measurements:      RESTRICTIONS/PRECAUTIONS: clotting mutation, hx of hysterectomy, fall in May 2022, hx of R plantar fasciitis     Treatment based classification:     Exercises/Interventions:     Therapeutic Ex 20' Wt / Resistance sets/sec reps notes          Hand heel rock  1 10 Performed in seated position due to knee pain   Crossover piriformis stretch  30\" 3    Fig 4 piriformis stretch  30\" 3    LTR  10\" 5 To L   Bridges w/BS  1 15    Lateral bandwalking Teal  1 2-3 laps Band around knees   Palloff press blue 3\" 15 B   S/L clamshells  2 10    Leg press # 2 10                 Therapeutic Activities 5'              Fwd step ups with alternating LE hip flex 6\" 1 10x ea                                              Manual Intervention 16'              Prone PA 10'   L3-5 CPA and R UPA mobs   GISTM/STM       Lumbar Mobs in S/L 6'   R   SI Manip       Hip belt mobs    NPV? STM 5'   R gastroc   Progress note NPV       TC joint mobs  Mid-foot mobs  Subtalar mobs 5'   R   NMR re-education 5'              Prone hip extension  1 10x  Table flexed   Quadruped alternating LE  1 10x ea With red therabar for cues                    Pt. Education:  -pt educated on diagnosis, prognosis and expectations for rehab  -all pt questions were answered    Home Exercise Program:  [address] access code [facility]    Therapeutic Exercise and NMR EXR  [x] (71240) Provided verbal/tactile cueing for activities related to strengthening, flexibility, endurance, ROM  for improvements in proximal hip and core control with self care, mobility, lifting and ambulation.  [] (31991) Provided verbal/tactile cueing for activities related to improving balance, coordination, kinesthetic sense, posture, motor skill, proprioception  to assist with core control in self care, mobility, lifting, and ambulation.      Therapeutic Activities:    [] (13827 or 37928) Provided verbal/tactile cueing for activities related to improving balance, coordination, kinesthetic sense, posture, motor skill, proprioception and motor activation to allow for proper function  with self care and ADLs  [] (62135) Provided training and instruction to the patient for proper core and proximal hip recruitment and positioning with ambulation re-education     Home Exercise Program:    [x] (25639) Reviewed/Progressed HEP activities related to strengthening, flexibility, endurance, ROM of core, proximal hip and LE for functional self-care, mobility, lifting and ambulation   [] (55827) Reviewed/Progressed HEP activities related to improving balance, coordination, kinesthetic sense, posture, motor skill, proprioception of core, proximal hip and LE for self care, mobility, lifting, and ambulation      Manual Treatments:  PROM / STM / Oscillations-Mobs:  G-I, II, III, IV (PA's, Inf., Post.)  [x] (08221) Provided manual therapy to mobilize proximal hip and LS spine soft tissue/joints for the purpose of modulating pain, promoting relaxation,  increasing ROM, reducing/eliminating soft tissue swelling/inflammation/restriction, improving soft tissue extensibility and allowing for proper ROM for normal function with self care, mobility, lifting and ambulation. Modalities:       Charges:  Timed Code Treatment Minutes: 46   Total Treatment Minutes: 46       [] EVAL (LOW) 07422 (typically 20 minutes face-to-face)  [] EVAL (MOD) 80767 (typically 30 minutes face-to-face)  [] EVAL (HIGH) 12171 (typically 45 minutes face-to-face)  [] RE-EVAL     [x] LK(15877) x  2   [] IONTO (06820)  [] NMR (88147) x     [] VASO (64639)  [x] Manual (16401) x 1    [] Other:  [] TA (34727)x     [] Mech Traction (74801)  [] ES(attended) (15628)     [] ES (un) (00834): If BW Please Indicate Time In/Out and Total Minutes  CPT Code Time in Time out Total Min                                            Goals:   Patient stated goal: \"To reduce my pain and increase my range of motion and strength\"  [] Progressing: [] Met: [] Not Met: [] Adjusted     Therapist goals for Patient:   Short Term Goals: To be achieved in: 2 weeks  1. Independent in HEP and progression per patient tolerance, in order to prevent re-injury.    [] Progressing: [] Met: [] Not Met: [] Adjusted  2. Patient will have a decrease in pain to facilitate improvement in movement, function, and ADLs as indicated by Functional Deficits. [] Progressing: [] Met: [] Not Met: [] Adjusted     Long Term Goals: To be achieved in: 6-8 weeks  1. Patient will reach SOLEDAD functional score of less than or equal to 20 to assist with reaching prior level of function with activities such as sitting and standing. [] Progressing: [] Met: [] Not Met: [] Adjusted  2. Patient will demonstrate increased AROM to WNL, good LS mobility, good hip ROM to allow for proper joint functioning as indicated by patients Functional Deficits. [] Progressing: [] Met: [] Not Met: [] Adjusted  3. Patient will demonstrate an increase in Strength to good proximal hip and core activation to allow for proper functional mobility as indicated by patients Functional Deficits. [] Progressing: [] Met: [] Not Met: [] Adjusted  4. Patient will return to sitting for 30 minutes without increased symptoms or restriction. [] Progressing: [] Met: [] Not Met: [] Adjusted  5. Patient will be able to sleep for 1 night without disturbances due to increased symptoms or restriction. [] Progressing: [] Met: [] Not Met: [] Adjusted           ASSESSMENT:  Pt continues to respond well to manual therapy with improved mobility noted afterwards. Pt required verbal and tactile cues to maintain appropriate position and form with S/L clamshells. Cues for glut activation and to decrease valgus collapse initially with forward step ups. Pt would continue to respond well to skilled PT services to address lumbar hypomobility and core/glut strength in order to return to PLOF.      Treatment/Activity Tolerance:  [x] Patient tolerated treatment well [] Patient limited by fatique  [] Patient limited by pain  [] Patient limited by other medical complications  [] Other:     Overall Progression Towards Functional goals/ Treatment Progress Update:  [] Patient is progressing as expected towards functional goals listed. [] Progression is slowed due to complexities/Impairments listed. [] Progression has been slowed due to co-morbidities. [x] Plan just implemented, too soon to assess goals progression <30days   [] Goals require adjustment due to lack of progress  [] Patient is not progressing as expected and requires additional follow up with physician  [] Other:    Prognosis for POC: [x] Good [] Fair  [] Poor    Patient requires continued skilled intervention: [x] Yes  [] No        PLAN: Decrease pain, improve mobility and stability. [x] Continue per plan of care [] Alter current plan (see comments)  [] Plan of care initiated [] Hold pending MD visit [] Discharge    Electronically signed by: Monty Leiva PT    Note: If patient does not return for scheduled/recommended follow up visits, this note will serve as a discharge from care along with the most recent update on progress.

## 2023-03-16 NOTE — TELEPHONE ENCOUNTER
Final call made to pt to try and schedule allergy testing. Mailbox full and unable to leave message. My chart message previously sent with no response.

## 2024-01-02 ENCOUNTER — OFFICE VISIT (OUTPATIENT)
Dept: ORTHOPEDIC SURGERY | Age: 59
End: 2024-01-02
Payer: COMMERCIAL

## 2024-01-02 VITALS — HEIGHT: 68 IN | WEIGHT: 182 LBS | BODY MASS INDEX: 27.58 KG/M2

## 2024-01-02 DIAGNOSIS — M79.671 PAIN IN BOTH FEET: Primary | ICD-10-CM

## 2024-01-02 DIAGNOSIS — M72.2 PLANTAR FIBROMATOSIS: ICD-10-CM

## 2024-01-02 DIAGNOSIS — M54.16 LUMBAR RADICULOPATHY: ICD-10-CM

## 2024-01-02 DIAGNOSIS — M79.672 PAIN IN BOTH FEET: Primary | ICD-10-CM

## 2024-01-02 PROCEDURE — 99214 OFFICE O/P EST MOD 30 MIN: CPT | Performed by: NURSE PRACTITIONER

## 2024-01-02 NOTE — PROGRESS NOTES
facility-administered medications on file prior to visit.       Pertinent items are noted in HPI  Review of systems reviewed from Patient History Form and available in the patient's chart under the Media tab. No change.       PHYSICAL EXAMINATION:  Ms. Gloria is a very pleasant 58 y.o.  female who presents today in no acute distress, awake, alert, and oriented.  She is well dressed, nourished and  groomed.  Patient with normal affect.  Height is  1.727 m (5' 8\"), weight is 82.6 kg (182 lb), Body mass index is 27.67 kg/m².  Resting respiratory rate is 16.     Examination of the gait, showed that the patient walks heel-toe with a non-antalgic gait and no limp. Examination of both ankles showing a good range of motion.  She has dorsiflexion to about 10 degrees bilaterally, which increased with knee flexion. She has intact sensation and good pedal pulses.  She has good strength in all four planes, including eversion, and has moderate tenderness on deep palpation over palpable small masses bilateral mid plantar fascia.  She has a positive straight leg raise on the right.  She has mild tenderness to palpation over the lower lumbar spine.  The ankles are stable to drawer test bilaterally, equally.      IMAGING:Xray's were reviewed, 3 views of the bilateral foot taken in office today, and showed no acute fracture. No other abnormality.    IMPRESSION:   1-Left plantar fibromatosis.   2-Right sciatica    PLAN: I discussed with the patient the treatment options.  We recommended stretching exercises of the calf as well as stretching of the plantar fascia which was taught to the patient today. She will take NSAIDS as needed and over-the-counter, but prefers not taking oral medication.  I discussed with her the findings of the sciatica and radiculopathy and discussed options of medications, therapy or injections.  She would like to try the therapy.  Use silicone heel pad . I discussed with the patient that I think that

## 2024-01-09 ENCOUNTER — OFFICE VISIT (OUTPATIENT)
Dept: ORTHOPEDIC SURGERY | Age: 59
End: 2024-01-09

## 2024-01-09 VITALS — WEIGHT: 181 LBS | BODY MASS INDEX: 27.52 KG/M2

## 2024-01-09 DIAGNOSIS — M79.642 LEFT HAND PAIN: ICD-10-CM

## 2024-01-09 DIAGNOSIS — M18.0 ARTHRITIS OF CARPOMETACARPAL (CMC) JOINT OF BOTH THUMBS: ICD-10-CM

## 2024-01-09 DIAGNOSIS — M47.816 LUMBAR SPONDYLOSIS: ICD-10-CM

## 2024-01-09 DIAGNOSIS — M79.641 RIGHT HAND PAIN: Primary | ICD-10-CM

## 2024-01-09 DIAGNOSIS — M51.36 DDD (DEGENERATIVE DISC DISEASE), LUMBAR: ICD-10-CM

## 2024-01-09 RX ORDER — KETOROLAC TROMETHAMINE 10 MG/1
10 TABLET, FILM COATED ORAL
Qty: 15 TABLET | Refills: 0 | Status: SHIPPED | OUTPATIENT
Start: 2024-01-09

## 2024-01-09 NOTE — PROGRESS NOTES
swelling or worsening of the condition.    Maggi Lewis CMC Controller Plus     Patient was prescribed a Maggi Lewis CMC Controller Plus Thumb Splint.    The bilateral thumbs will require stabilization / immobilization from this semi-rigid / rigid orthosis to improve their function.  The orthosis will assist in protecting the affected area, provide functional support and facilitate healing.    The patient was educated and fit by a healthcare professional with expert knowledge and specialization in brace application while under the direct supervision of the physician.  Verbal and written instructions for the use of and application of this item were provided.   They were instructed to contact the office immediately should the brace result in increased pain, decreased sensation, increased swelling or worsening of the condition.           Other Outside Imaging and Testing Personally Reviewed:    XR FOOT RIGHT (MIN 3 VIEWS)    Result Date: 2024  Radiology exam is complete. No Radiologist dictation. Please follow up with ordering provider.     XR FOOT LEFT (MIN 3 VIEWS)    Result Date: 2024  Radiology exam is complete. No Radiologist dictation. Please follow up with ordering provider.      Patient Name: Hilda Gloria  Case ID: [id number]  Patient : 1965  Referring Physician: Ronen Brown MD  Exam Date: 2022  Exam Description: MR Lumbar Spine w/o Contrast      HISTORY:  57-year-old female with right-sided low back pain, worsening in the past few months.   Numbness/tingling in right thigh and toes for 4 years.    TECHNICAL FACTORS:  Long- and short-axis fat- and water-weighted images were performed.    COMPARISON:  None.    FINDINGS:  Spine Numbering: For purposes of this dictation, it is assumed that there are 5  non-rib-bearing, lumbar-type vertebrae, and the most caudal fully segmented lumbar vertebra is  labeled L5.    Cord and Conus Medullaris: Terminates at a normal level.    Hemangioma

## 2024-01-17 ENCOUNTER — APPOINTMENT (OUTPATIENT)
Dept: PHYSICAL THERAPY | Age: 59
End: 2024-01-17
Payer: COMMERCIAL

## 2024-01-24 ENCOUNTER — HOSPITAL ENCOUNTER (OUTPATIENT)
Dept: PHYSICAL THERAPY | Age: 59
Setting detail: THERAPIES SERIES
Discharge: HOME | End: 2024-01-24
Payer: COMMERCIAL

## 2024-01-24 DIAGNOSIS — M54.50 CHRONIC BILATERAL LOW BACK PAIN, UNSPECIFIED WHETHER SCIATICA PRESENT: Primary | ICD-10-CM

## 2024-01-24 DIAGNOSIS — G89.29 CHRONIC BILATERAL LOW BACK PAIN, UNSPECIFIED WHETHER SCIATICA PRESENT: Primary | ICD-10-CM

## 2024-01-24 DIAGNOSIS — R20.0 NUMBNESS IN RIGHT LEG: ICD-10-CM

## 2024-01-24 PROCEDURE — 97110 THERAPEUTIC EXERCISES: CPT

## 2024-01-24 PROCEDURE — 97162 PT EVAL MOD COMPLEX 30 MIN: CPT

## 2024-01-24 NOTE — PLAN OF CARE
performed to prevent loss of range of motion, maintain or improve muscular strength or increase flexibility, following either an injury or surgery.  (04152) NEUROMUSCULAR RE-EDUCATION - Therapeutic procedure, 1 or more areas, each 15 minutes; neuromuscular reeducation of movement, balance, coordination, kinesthetic sense, posture, and/or proprioception for sitting and/or standing activities  (45716) THERAPEUTIC ACTIVITY - use of dynamic activities to improve functional performance. (Ex include squatting, ascending/descending stairs, walking, bending, lifting, catching, throwing, pushing, pulling, jumping.)  Direct, one on one contact, billed in 15-minute increments.      GOALS     Patient stated goal: \"To get back to gardening and bicycling\"  Status:  [] Progressing: [] Met: [] Not Met: [] Adjusted    Therapist goals for Patient:   Short Term Goals: To be achieved in: 2 weeks  Independent in HEP and progression per patient tolerance, in order to progress toward full function and prevent re-injury.    Status: [] Progressing: [] Met: [] Not Met: [] Adjusted  Patient will have a decrease in pain to 2/10 to help facilitate improvement in movement, function, and ADLs as indicated by functional deficits.   Status: [] Progressing: [] Met: [] Not Met: [] Adjusted    Long Term Goals: To be achieved in: 4-6 weeks  Disability index score of 30% or less for the Modified Oswestry to assist with return top prior level of function.   Status: [] Progressing: [] Met: [] Not Met: [] Adjusted  Improve ROM to WNL to allow for proper joint functioning as indicated by patients functional deficits.  Status: [] Progressing: [] Met: [] Not Met: [] Adjusted  Pt to improve strength to 4+/5 or better of proximal hip, multifidus, and TrA/abdominal to allow for proper muscle and joint use in functional mobility, ADLs and prior level of function   Status: [] Progressing: [] Met: [] Not Met: [] Adjusted  Patient will return to  squatting  without

## 2024-01-29 ENCOUNTER — HOSPITAL ENCOUNTER (OUTPATIENT)
Dept: PHYSICAL THERAPY | Age: 59
Setting detail: THERAPIES SERIES
Discharge: HOME | End: 2024-01-29
Payer: COMMERCIAL

## 2024-01-29 PROCEDURE — 97140 MANUAL THERAPY 1/> REGIONS: CPT

## 2024-01-29 PROCEDURE — 97110 THERAPEUTIC EXERCISES: CPT

## 2024-01-29 NOTE — FLOWSHEET NOTE
[facility] - Outpatient Rehabilitation and Therapy [address], [place] [address] office: [phone] fax: [phone]       Physical Therapy: TREATMENT/PROGRESS NOTE   Patient: Hilda Gloria (58 y.o. female)   Examination Date: 2024   :  1965 MRN: [id number]   Visit #: 2   Insurance Allowable Auth Needed   [place] []Yes    [x]No    Insurance: Payor: CIGNA / Plan: CIGNA / Product Type: *No Product type* /   Insurance ID: [id number] - (Commercial)  Secondary Insurance (if applicable):    Treatment Diagnosis:     ICD-10-CM    1. Chronic bilateral low back pain, unspecified whether sciatica present  M54.50     G89.29       2. Numbness in right leg  R20.0          Medical Diagnosis:  DDD (degenerative disc disease), lumbar [M51.36]  Lumbar spondylosis [M47.816]   Referring Physician: Ronen Brown*  PCP: Arnaldo Lewis MD       Plan of care signed (Y/N):     Date of Patient follow up with Physician:      Progress Report/POC: EVAL today  POC update due: (10 visits /OR AUTH LIMITS, whichever is less)  2024                                             Precautions/ Contra-indications: clotting mutation (prothrombin factor II); hysterectomy ; hx of B plantar fasciitis          Latex allergy:  NO  Pacemaker:    NO  Contraindications for Manipulation: None  Date of Surgery: N/A  Other:    Red Flags:  None    C-SSRS Triggered by Intake questionnaire:   [x] No, Questionnaire did not trigger screening.   [] Yes, Patient intake triggered further evaluation      [] C-SSRS Screening completed  [] PCP notified via Plan of Care  [] Emergency services notified     Preferred Language for Healthcare:   [x] English       [] other:    SUBJECTIVE EXAMINATION     Patient stated complaint:  Pt reports that she feels her mobility has been better the last few days. Pt reports that she had a muscle cramp/spasm in her R anterior hip this morning upon waking up, that she

## 2024-02-07 ENCOUNTER — HOSPITAL ENCOUNTER (OUTPATIENT)
Dept: PHYSICAL THERAPY | Age: 59
Setting detail: THERAPIES SERIES
Discharge: HOME | End: 2024-02-07
Payer: COMMERCIAL

## 2024-02-07 ENCOUNTER — OFFICE VISIT (OUTPATIENT)
Dept: ORTHOPEDIC SURGERY | Age: 59
End: 2024-02-07

## 2024-02-07 VITALS — HEIGHT: 68 IN | WEIGHT: 182 LBS | BODY MASS INDEX: 27.58 KG/M2

## 2024-02-07 DIAGNOSIS — M18.0 ARTHRITIS OF CARPOMETACARPAL (CMC) JOINT OF BOTH THUMBS: ICD-10-CM

## 2024-02-07 DIAGNOSIS — S13.9XXA CERVICAL SPRAIN, INITIAL ENCOUNTER: Primary | ICD-10-CM

## 2024-02-07 DIAGNOSIS — M51.36 DDD (DEGENERATIVE DISC DISEASE), LUMBAR: ICD-10-CM

## 2024-02-07 DIAGNOSIS — M47.816 LUMBAR FACET ARTHROPATHY: ICD-10-CM

## 2024-02-07 DIAGNOSIS — M47.816 LUMBAR SPONDYLOSIS: ICD-10-CM

## 2024-02-07 PROCEDURE — 97140 MANUAL THERAPY 1/> REGIONS: CPT

## 2024-02-07 PROCEDURE — 97110 THERAPEUTIC EXERCISES: CPT

## 2024-02-07 RX ORDER — TIZANIDINE 4 MG/1
4 TABLET ORAL 3 TIMES DAILY PRN
Qty: 30 TABLET | Refills: 1 | Status: SHIPPED | OUTPATIENT
Start: 2024-02-07

## 2024-02-07 NOTE — PROGRESS NOTES
Chief Complaint:   Chief Complaint   Patient presents with    Hand Pain     Bilateral hands - Pain is much better. Brace and meds helped. She had an incident with her neck. PT for lumbar pain was going well until incident with her neck.          History of Present Illness:       Patient is a 58 y.o. female returns follow up for the above complaint. The patient was last seen approximately 1 monthsago. The symptoms are markedly improving since the last visit with respect to her thumb CMC joint arthritis pain. The patient has had no further testing for the problem.      New complaint relates to neck pain related to an injury sustained while building a chicken coop.  The board that she was stabilizing with her left hand while hammering with her right gave way and she sustained a whiplash mechanism injury to the neck region.  Now approximately  7 to 10 days ago the neck pain is location: left sided  severity: moderate and is worsened by active range of motion.    The patient has not noted new onset or progressive weakness of the upper extremites. The neck pain : arm pain is 100 : 0     Treatment to date has included medication: NSAID: Meloxicam and local measures  and symptoms have not improved with this treatment.     The patient denies history of neck trauma. There is not history or orthopaedic cervical spine surgery.     Work up to date has included:  None    The patient has no prior history of autoimmune disease, inflammatory arthropathy or crystal arthropathy.    She has started physical therapy with respect to her low back in the interim.    Back: leg pain 100:0. Pain in back is aching or sharp in quality.    Pain levels: Mild to moderate .     The patient has started supervised PT.    The patient denies new onset or progressive weakness of the lower extremities.  The patient denies new onset bowel or bladder dysfunction.    She continues on medical pain management as per previous inclusive of

## 2024-02-14 ENCOUNTER — HOSPITAL ENCOUNTER (OUTPATIENT)
Dept: PHYSICAL THERAPY | Age: 59
Setting detail: THERAPIES SERIES
Discharge: HOME | End: 2024-02-14
Payer: COMMERCIAL

## 2024-02-14 PROCEDURE — 97140 MANUAL THERAPY 1/> REGIONS: CPT

## 2024-02-14 PROCEDURE — 97110 THERAPEUTIC EXERCISES: CPT

## 2024-02-14 NOTE — FLOWSHEET NOTE
[facility] - Outpatient Rehabilitation and Therapy [address], [place] [address] office: [phone] fax: [phone]       Physical Therapy: TREATMENT/PROGRESS NOTE   Patient: Hilda Gloria (58 y.o. female)   Examination Date: 2024   :  1965 MRN: [id number]   Visit #: 4   Insurance Allowable Auth Needed   MN []Yes    [x]No    Insurance: Payor: CIGNA / Plan: CIGNA / Product Type: *No Product type* /   Insurance ID: [id number] - (Commercial)  Secondary Insurance (if applicable):    Treatment Diagnosis:     ICD-10-CM    1. Chronic bilateral low back pain, unspecified whether sciatica present  M54.50     G89.29       2. Numbness in right leg  R20.0          Medical Diagnosis:  DDD (degenerative disc disease), lumbar [M51.36]  Lumbar spondylosis [M47.816]   Referring Physician: Ronen Brown*  PCP: Arnaldo Lewis MD       Plan of care signed (Y/N):     Date of Patient follow up with Physician:      Progress Report/POC: EVAL today  POC update due: (10 visits /OR AUTH LIMITS, whichever is less)  3/15/2024                                             Precautions/ Contra-indications: clotting mutation (prothrombin factor II); hysterectomy ; hx of B plantar fasciitis          Latex allergy:  NO  Pacemaker:    NO  Contraindications for Manipulation: None  Date of Surgery: N/A  Other:    Red Flags:  None    C-SSRS Triggered by Intake questionnaire:   [x] No, Questionnaire did not trigger screening.   [] Yes, Patient intake triggered further evaluation      [] C-SSRS Screening completed  [] PCP notified via Plan of Care  [] Emergency services notified     Preferred Language for Healthcare:   [x] English       [] other:    SUBJECTIVE EXAMINATION     Patient stated complaint:  Pt reports that her N/T in R thigh has been much better overall. Pt reports that she was a little sore all over in back, hip and both feet after being on her feet walking at [employer]

## 2024-02-21 ENCOUNTER — APPOINTMENT (OUTPATIENT)
Dept: PHYSICAL THERAPY | Age: 59
End: 2024-02-21
Payer: COMMERCIAL

## 2024-02-28 ENCOUNTER — HOSPITAL ENCOUNTER (OUTPATIENT)
Dept: PHYSICAL THERAPY | Age: 59
Setting detail: THERAPIES SERIES
Discharge: HOME | End: 2024-02-28
Payer: COMMERCIAL

## 2024-02-28 PROCEDURE — 97140 MANUAL THERAPY 1/> REGIONS: CPT

## 2024-02-28 PROCEDURE — 97110 THERAPEUTIC EXERCISES: CPT

## 2024-02-28 NOTE — FLOWSHEET NOTE
STM/theragun  15'  L piriformis, medial HS          NMR re-education (68244) resistance Sets/time Reps CUES NEEDED                                      Therapeutic Activity (75061)  Sets/time            Lateral stepping orange 1 3 laps Band around ankles                          Modalities:    No modalities applied this session    Education/Home Exercise Program: Patient HEP program created electronically.  Refer to [facility] access code: [email or web address]        ASSESSMENT     Today's Assessment:  TTP and tightness to L piriformis and medial HS today, addressed with increased emphasis on manual therapy. Pt reports near resolution of L LE sxs after manual therapy.  Returned to bridges with toes up for glute emphasis and added DAWSON abd for glut med strengthening.         Medical Necessity Documentation:  I certify that this patient meets the below criteria necessary for medical necessity for care and/or justification of therapy services:  The patient has functional impairments and/or activity limitations and would benefit from continued outpatient therapy services to address the deficits outlined in the patients goals    Therapy Session Tolerance:  [x] Patient tolerated treatment well [] Patient limited by fatique  [] Patient limited by pain  [] Patient limited by other medical complications  [] Other:     Return to Play: NA    Prognosis for POC: [x] Good [] Fair  [] Poor    Patient requires continued skilled intervention: [x] Yes  [] No      CHARGE CAPTURE     PT CHARGE GRID   CPT Code (TIMED) minutes # CPT Code (UNTIMED) #     Therex (28960)  15 1  EVAL:MODERATE (96601 - Typically 30 minutes face-to-face)     Neuromusc. Re-ed (56668)    Re-Eval (32365)     Manual (26573) 25 2  Estim Unattended (91173)     Ther. Act (46005)    Mech. Traction (12999)     Gait (22132)    Dry Needle 1-2 muscle (20560)     Aquatic Therex (71238)    Dry Needle 3+ muscle (20561)     Iontophoresis (76508)    VASO (08631)     Ultrasound (75674)

## 2024-03-06 ENCOUNTER — HOSPITAL ENCOUNTER (OUTPATIENT)
Dept: PHYSICAL THERAPY | Age: 59
Setting detail: THERAPIES SERIES
Discharge: HOME | End: 2024-03-06
Payer: COMMERCIAL

## 2024-03-06 ENCOUNTER — APPOINTMENT (OUTPATIENT)
Dept: PHYSICAL THERAPY | Age: 59
End: 2024-03-06
Payer: COMMERCIAL

## 2024-03-06 DIAGNOSIS — M54.2 NECK PAIN: Primary | ICD-10-CM

## 2024-03-06 PROCEDURE — 97140 MANUAL THERAPY 1/> REGIONS: CPT

## 2024-03-06 PROCEDURE — 97161 PT EVAL LOW COMPLEX 20 MIN: CPT

## 2024-03-06 PROCEDURE — 97110 THERAPEUTIC EXERCISES: CPT

## 2024-03-06 NOTE — PLAN OF CARE
increased symptoms or restriction to work towards return to prior level of function.        Status: [] Progressing: [] Met: [] Not Met: [] Adjusted  Patient will be able to drive and turn her head both directions without increased symptoms or restriction.              Status: [] Progressing: [] Met: [] Not Met: [] Adjusted    TREATMENT PLAN     Frequency/Duration: 1-2x/week for 6 weeks for the following treatment interventions:    Interventions:  [x] Therapeutic exercise including: strength training, ROM, including postural re-education.   [x] NMR activation and proprioception, including postural re-education.    [x] Manual therapy as indicated to include: PROM, Gr I-IV mobilizations, and STM  [x] Modalities as needed that may include: Cryotherapy and Thermal Agents  [x] Patient education on joint protection, postural re-education, activity modification, progression of HEP.        [] Aquatic Therapy    Plan: POC initiated as per evaluation    Electronically Signed by Kathryn Pace, PT  Date: 03/06/2024     Note: Portions of this note have been templated and/or copied from initial evaluation, reassessments and prior notes for documentation efficiency.

## 2024-03-13 ENCOUNTER — HOSPITAL ENCOUNTER (OUTPATIENT)
Dept: PHYSICAL THERAPY | Age: 59
Setting detail: THERAPIES SERIES
Discharge: HOME | End: 2024-03-13
Payer: COMMERCIAL

## 2024-03-13 PROCEDURE — 97110 THERAPEUTIC EXERCISES: CPT

## 2024-03-13 PROCEDURE — 97140 MANUAL THERAPY 1/> REGIONS: CPT

## 2024-03-13 NOTE — PLAN OF CARE
especially on R today.     Medical Necessity Documentation:  I certify that this patient meets the below criteria necessary for medical necessity for care and/or justification of therapy services:  The patient has functional impairments and/or activity limitations and would benefit from continued outpatient therapy services to address the deficits outlined in the patients goals  The patient had a prior episode of outpatient therapy during this calendar year for a different condition.  Current diagnosis requires skilled therapeutic intervention.      Return to Play: NA    Prognosis for POC: [x] Good [] Fair  [] Poor    Patient requires continued skilled intervention: [x] Yes  [] No      CHARGE CAPTURE     PT CHARGE GRID   CPT Code (TIMED) minutes # CPT Code (UNTIMED) #     Therex (13992)  17 1  EVAL:LOW (09289 - Typically 20 minutes face-to-face)     Neuromusc. Re-ed (07508)    Re-Eval (46539)     Manual (41029) 23 2  Estim Unattended (24803)     Ther. Act (72069)    Mech. Traction (86831)     Gait (84278)    Dry Needle 1-2 muscle (68225)     Aquatic Therex (78496)    Dry Needle 3+ muscle (20561)     Iontophoresis (57468)    VASO (94093)     Ultrasound (00478)    Group Therapy (88008)     Estim Attended (30862)    Canalith Repositioning (48711)     Other:    Other:    Total Timed Code Tx Minutes 40 3       Total Treatment Minutes 40        Charge Justification:  (53913) THERAPEUTIC EXERCISE - Provided verbal/tactile cueing for activities related to strengthening, flexibility, endurance, ROM performed to prevent loss of range of motion, maintain or improve muscular strength or increase flexibility, following either an injury or surgery.   (24636) HOME EXERCISE PROGRAM - Reviewed/Progressed HEP activities related to strengthening, flexibility, endurance, ROM performed to prevent loss of range of motion, maintain or improve muscular strength or increase flexibility, following either an injury or surgery.  (00667)

## 2024-03-20 ENCOUNTER — APPOINTMENT (OUTPATIENT)
Dept: PHYSICAL THERAPY | Age: 59
End: 2024-03-20
Payer: COMMERCIAL

## 2024-03-27 ENCOUNTER — HOSPITAL ENCOUNTER (OUTPATIENT)
Dept: PHYSICAL THERAPY | Age: 59
Setting detail: THERAPIES SERIES
Discharge: HOME | End: 2024-03-27
Payer: COMMERCIAL

## 2024-03-27 PROCEDURE — 97140 MANUAL THERAPY 1/> REGIONS: CPT

## 2024-03-27 PROCEDURE — 97110 THERAPEUTIC EXERCISES: CPT

## 2024-03-27 NOTE — PLAN OF CARE
Other:    Other:    Total Timed Code Tx Minutes 45        Total Treatment Minutes 45        Charge Justification:  (56101) THERAPEUTIC EXERCISE - Provided verbal/tactile cueing for activities related to strengthening, flexibility, endurance, ROM performed to prevent loss of range of motion, maintain or improve muscular strength or increase flexibility, following either an injury or surgery.   (09376) HOME EXERCISE PROGRAM - Reviewed/Progressed HEP activities related to strengthening, flexibility, endurance, ROM performed to prevent loss of range of motion, maintain or improve muscular strength or increase flexibility, following either an injury or surgery.  (35254) NEUROMUSCULAR RE-EDUCATION - Therapeutic procedure, 1 or more areas, each 15 minutes; neuromuscular reeducation of movement, balance, coordination, kinesthetic sense, posture, and/or proprioception for sitting and/or standing activities  (92540) THERAPEUTIC ACTIVITY - use of dynamic activities to improve functional performance. (Ex include squatting, ascending/descending stairs, walking, bending, lifting, catching, throwing, pushing, pulling, jumping.)  Direct, one on one contact, billed in 15-minute increments.      GOALS     Patient stated goal: \"To get back to gardening and bicycling\"  Status:  [x] Progressing: [] Met: [] Not Met: [] Adjusted    Therapist goals for Patient:   Short Term Goals: To be achieved in: 2 weeks  Independent in HEP and progression per patient tolerance, in order to progress toward full function and prevent re-injury.    Status: [x] Progressing: [] Met: [] Not Met: [] Adjusted  Patient will have a decrease in pain to 2/10 to help facilitate improvement in movement, function, and ADLs as indicated by functional deficits.   Status: [x] Progressing: [] Met: [] Not Met: [] Adjusted    Long Term Goals: To be achieved in: 4-6 weeks  Disability index score of 30% or less for the Modified Oswestry to assist with return top prior level

## (undated) DEVICE — ADHESIVE SKIN CLSR 0.7ML TOP DERMBND ADV

## (undated) DEVICE — INVIEW CLEAR LEGGINGS: Brand: CONVERTORS

## (undated) DEVICE — SUTURE VCRL SZ 0 L18IN ABSRB UD L36MM CT-1 1/2 CIR J840D

## (undated) DEVICE — SUTURE MCRYL SZ 4-0 L18IN ABSRB UD L19MM PS-2 3/8 CIR PRIM Y496G

## (undated) DEVICE — BLADE CLIPPER GEN PURP NS

## (undated) DEVICE — SUTURE VCRL SZ 3-0 L36IN ABSRB UD L36MM CT-1 1/2 CIR J944H

## (undated) DEVICE — TRAY PREP DRY W/ PREM GLV 2 APPL 6 SPNG 2 UNDPD 1 OVERWRAP

## (undated) DEVICE — SUTURE COAT VCRL SZ 0 L18IN ABSRB UD W/O NDL POLYGLACTIN J112T

## (undated) DEVICE — CATHETER TRAY 16 FR 5 CC FOL ANTIREFLX SAMPLING PRT DOVER

## (undated) DEVICE — STAT BAG WITH DISPENSER PACK 6X9

## (undated) DEVICE — SUTURE VCRL SZ 0 L36IN ABSRB UD L36MM CT-1 1/2 CIR J946H

## (undated) DEVICE — SCRUB SURG BDINE FREE 4 OZ TECHNICARE

## (undated) DEVICE — SOLUTION IV IRRIG WATER 1000ML POUR BRL 2F7114

## (undated) DEVICE — DRAPE,LAP,CHOLE,W/TROUGHS,STERILE: Brand: MEDLINE

## (undated) DEVICE — CHLORAPREP 26ML ORANGE

## (undated) DEVICE — SUTURE ABSORBABLE BRAIDED 0 CT-1 8X27 IN UD VICRYL JJ41G

## (undated) DEVICE — DEVICE SECUREMENT AD W/ TRICOT ANCHR PD FOR F LTX SIL CATH

## (undated) DEVICE — GAUZE,SPONGE,4"X4",8PLY,STRL,LF,10/TRAY: Brand: MEDLINE

## (undated) DEVICE — WOUND RETRACTOR AND PROTECTOR: Brand: ALEXIS O WOUND PROTECTOR-RETRACTOR

## (undated) DEVICE — STERILE POLYISOPRENE POWDER-FREE SURGICAL GLOVES: Brand: PROTEXIS

## (undated) DEVICE — DRAPE THER FLUID WARMING 66X44 IN FLAT SLUSH DBL DISC ORS

## (undated) DEVICE — DECANTER FLD 9IN ST BG FOR ASEP TRNSF OF FLD

## (undated) DEVICE — SUTURE VCRL SZ 3-0 L27IN ABSRB UD L26MM SH 1/2 CIR J416H

## (undated) DEVICE — SPONGE,PEANUT,XRAY,ST,SM,3/8",5/CARD: Brand: MEDLINE INDUSTRIES, INC.

## (undated) DEVICE — SUTURE MCRYL SZ 3-0 L36IN ABSRB UD L36MM CT-1 1/2 CIR Y944H

## (undated) DEVICE — MAJOR SET UP PK

## (undated) DEVICE — WOUND RETRACTOR AND PROTECTOR: Brand: ALEXIS WOUND PROTECTOR-RETRACTOR

## (undated) DEVICE — LIGHT HANDLE: Brand: DEVON

## (undated) DEVICE — STERILE LATEX POWDER-FREE SURGICAL GLOVESWITH NITRILE COATING: Brand: PROTEXIS

## (undated) DEVICE — SPONGE LAP W18XL18IN WHT COT 4 PLY FLD STRUNG RADPQ DISP ST

## (undated) DEVICE — SUTURE VCRL SZ 2-0 L36IN ABSRB UD L36MM CT-1 1/2 CIR J945H

## (undated) DEVICE — GOWN,AURORA,NONREINF,RAGLAN,XXL,STERILE: Brand: MEDLINE

## (undated) DEVICE — NEEDLE HYPO 22GA L1.5IN BLK POLYPR HUB S STL REG BVL STR

## (undated) DEVICE — SOLUTION IV IRRIG POUR BRL 0.9% SODIUM CHL 2F7124

## (undated) DEVICE — ELECTRODE PT RET AD L9FT HI MOIST COND ADH HYDRGEL CORDED

## (undated) DEVICE — SUTURE PDS II SZ 1 L96IN ABSRB VLT TP-1 L65MM 1/2 CIR Z880G

## (undated) DEVICE — SUTURE MCRYL SZ 4-0 L27IN ABSRB UD L19MM PS-2 1/2 CIR PRIM Y426H

## (undated) DEVICE — SUTURE VCRL SZ 0 L18IN ABSRB VLT L26MM CT-2 1/2 CIR J727D

## (undated) DEVICE — SUTURE MCRYL SZ 2-0 L27IN ABSRB UD SH L26MM TAPERPOINT NDL Y417H

## (undated) DEVICE — PAD,ABDOMINAL,8"X10",ST,LF: Brand: MEDLINE

## (undated) DEVICE — SHEET, T, LAPAROTOMY, STERILE: Brand: MEDLINE

## (undated) DEVICE — SUTURE VCRL SZ 0 L54IN ABSRB UD POLYGLACTIN 910 COAT BRAID J608H

## (undated) DEVICE — YANKAUER,BULB TIP,W/O VENT,RIGID,STERILE: Brand: MEDLINE

## (undated) DEVICE — MERCY FAIRFIELD TURNOVER KIT: Brand: MEDLINE INDUSTRIES, INC.

## (undated) DEVICE — BINDER ABD 2XL H12XL60 75IN UNISX STD E 4 PNL DISPOSABLE